# Patient Record
Sex: FEMALE | Race: WHITE | NOT HISPANIC OR LATINO | Employment: OTHER | URBAN - METROPOLITAN AREA
[De-identification: names, ages, dates, MRNs, and addresses within clinical notes are randomized per-mention and may not be internally consistent; named-entity substitution may affect disease eponyms.]

---

## 2018-12-20 ENCOUNTER — OFFICE VISIT (OUTPATIENT)
Dept: MEDICAL GROUP | Age: 83
End: 2018-12-20
Payer: MEDICARE

## 2018-12-20 VITALS
WEIGHT: 132 LBS | DIASTOLIC BLOOD PRESSURE: 60 MMHG | SYSTOLIC BLOOD PRESSURE: 108 MMHG | HEIGHT: 62 IN | OXYGEN SATURATION: 99 % | BODY MASS INDEX: 24.29 KG/M2 | TEMPERATURE: 98.3 F | HEART RATE: 55 BPM

## 2018-12-20 DIAGNOSIS — E78.00 PURE HYPERCHOLESTEROLEMIA: ICD-10-CM

## 2018-12-20 DIAGNOSIS — I69.30 HISTORY OF HEMORRHAGIC CEREBROVASCULAR ACCIDENT (CVA) WITH RESIDUAL DEFICIT: ICD-10-CM

## 2018-12-20 DIAGNOSIS — I10 ESSENTIAL HYPERTENSION: Primary | ICD-10-CM

## 2018-12-20 DIAGNOSIS — Z23 NEED FOR VACCINATION: ICD-10-CM

## 2018-12-20 DIAGNOSIS — R56.9 SEIZURE (HCC): ICD-10-CM

## 2018-12-20 PROCEDURE — G0009 ADMIN PNEUMOCOCCAL VACCINE: HCPCS | Performed by: FAMILY MEDICINE

## 2018-12-20 PROCEDURE — G0008 ADMIN INFLUENZA VIRUS VAC: HCPCS | Performed by: FAMILY MEDICINE

## 2018-12-20 PROCEDURE — 99204 OFFICE O/P NEW MOD 45 MIN: CPT | Mod: 25 | Performed by: FAMILY MEDICINE

## 2018-12-20 PROCEDURE — 90662 IIV NO PRSV INCREASED AG IM: CPT | Performed by: FAMILY MEDICINE

## 2018-12-20 PROCEDURE — 90670 PCV13 VACCINE IM: CPT | Performed by: FAMILY MEDICINE

## 2018-12-20 RX ORDER — LOSARTAN POTASSIUM 100 MG/1
100 TABLET ORAL DAILY
COMMUNITY
End: 2019-06-27 | Stop reason: SDUPTHER

## 2018-12-20 RX ORDER — LACOSAMIDE 50 MG/1
50 TABLET ORAL 2 TIMES DAILY
Qty: 180 TAB | Refills: 1 | Status: SHIPPED
Start: 2018-12-20 | End: 2019-05-15 | Stop reason: SDUPTHER

## 2018-12-20 RX ORDER — ATENOLOL 25 MG/1
12.5 TABLET ORAL DAILY
COMMUNITY
End: 2019-09-19 | Stop reason: SDUPTHER

## 2018-12-20 RX ORDER — ASPIRIN 81 MG/1
81 TABLET, CHEWABLE ORAL DAILY
COMMUNITY
End: 2019-09-17

## 2018-12-20 RX ORDER — LACOSAMIDE 50 MG/1
50 TABLET ORAL 2 TIMES DAILY
COMMUNITY
End: 2018-12-20 | Stop reason: SDUPTHER

## 2018-12-20 RX ORDER — ACETAMINOPHEN 160 MG
3000 TABLET,DISINTEGRATING ORAL
COMMUNITY

## 2018-12-20 RX ORDER — ROSUVASTATIN CALCIUM 20 MG/1
20 TABLET, COATED ORAL EVERY EVENING
COMMUNITY
End: 2019-09-19 | Stop reason: SDUPTHER

## 2018-12-20 ASSESSMENT — PATIENT HEALTH QUESTIONNAIRE - PHQ9: CLINICAL INTERPRETATION OF PHQ2 SCORE: 0

## 2018-12-20 NOTE — PROGRESS NOTES
Subjective:   CC: Establish care    HPI:     Ruchi Camacho is a 84 y.o. female who is here as an established patient.    Patient reports history of hypertension and hypercholesterolemia. She reports surgical history of partial hysterectomy. Patient denies alcohol or drug use. She does not drink alcohol. Patient is not sexually active. Pregnancy history of . Her children are all living.    Patient reports history of seizures. She states she was misdiagnosed with repeated TIAs, but a neurologist finally told them it was seizures. She needs a refill of her medication today.    Patient states her hypercholesterolemia is well controlled with medication.     reports history of left temporal hemorrhagic stroke in . He believes it started with high blood pressure. She had another in  which affected her motor and memory. He states that since then, her motor improved but her short term memory has not.    Patient is requesting influenza and pneumonia vaccine.    Current medicines (including changes today)  Current Outpatient Prescriptions   Medication Sig Dispense Refill   • losartan (COZAAR) 100 MG Tab Take 100 mg by mouth every day.     • aspirin (ASA) 81 MG Chew Tab chewable tablet Take 81 mg by mouth every day.     • Cholecalciferol (VITAMIN D3) 2000 UNIT Cap Take  by mouth.     • rosuvastatin (CRESTOR) 20 MG Tab Take 20 mg by mouth every evening.     • atenolol (TENORMIN) 25 MG Tab Take 12.5 mg by mouth every day.     • lacosamide (VIMPAT) 50 MG Tab tablet Take 1 Tab by mouth 2 Times a Day for 180 days. 180 Tab 1     No current facility-administered medications for this visit.      She  has no past medical history on file.    I personally reviewed patient's problem list, allergies, medications, family hx, social hx with patient and update [facility].     REVIEW OF SYSTEMS:  CONSTITUTIONAL:  Denies night sweats, fatigue, malaise, lethargy, fever or chills.  RESPIRATORY:  Denies cough, wheeze, hemoptysis,  "or shortness of breath.  CARDIOVASCULAR:  Denies chest pains, palpitations, pedal edema     Objective:     Blood pressure 108/60, pulse (!) 55, temperature 36.8 °C (98.3 °F), height 1.562 m (5' 1.5\"), weight 59.9 kg (132 lb), SpO2 99 %. Body mass index is 24.54 kg/m².    Physical Exam:  Constitutional: awake, alert, in no distress.  Skin: Warm, dry, good turgor, no rashes, bruises, ulcers in visible areas.  Neck: Trachea midline, no masses, no thyromegaly. No cervical or supraclavicular lymphadenopathy  Respiratory: Unlabored respiratory effort, lungs clear to auscultation, no wheezes, no rales.  Cardiovascular: Normal S1, S2, no murmur, no pedal edema.  Psych: Oriented x3, affect and mood wnl, intact judgement and insight.       Assessment and Plan:   The following treatment plan was discussed    1. Essential hypertension  Chronic, controlled with atenolol 12.5 mg and losartan 100 mg daily.  Her heart rate is slightly lower than normal.  However, patient's  state that she is feeling fine.  He is not interested in discontinuation of atenolol due to concerns of elevated blood pressure.  Patient is asymptomatic.  Plans:  - CBC WITH DIFFERENTIAL; Future  - COMP METABOLIC PANEL; Future  - MICROALBUMIN CREAT RATIO URINE; Future  -Continue atenolol 12.5 mg and losartan 100 mg daily.  Right leg    2. 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 Seizure (HCC)  Patient has history of hemologic strokes x2 in the past.  She did have some residual motor weakness and memory deficit.  She also developed seizure after.  Patient is taking lacosamide 50 mg twice daily for seizure.  Patient has appointment to establish care with neurology in May 2019.  Patient states that she is not able to get in to see neurology sooner.  However, she will continue to follow-up with her old neurologist in a few months.  Patient is due for refill of lacosamide.  Patient has not had any seizure for quite some " time.  Plans:  - lacosamide (VIMPAT) 50 MG Tab tablet; Take 1 Tab by mouth 2 Times a Day for 180 days.  Dispense: 180 Tab; Refill: 1  -Keep appointment to establish care with neurology  -Continue aspirin and Crestor     4. Pure hypercholesterolemia  Chronic, tolerating Crestor 20 mg daily, denies any side effects.  Will continue.  - Lipid Profile; Future    5. Need for vaccination  - INFLUENZA VACCINE, HIGH DOSE (65+ ONLY)  - Pneumococcal Conjugate Vaccine 13-Valent <4yo IM     Lisa Pratt M.D.    Followup: Return in about 6 months (around 6/20/2019) for Annual wellness visit.    Please note that this dictation was created using voice recognition software and/or scribes. I have made every reasonable attempt to correct obvious errors, but I expect that there are errors of grammar and possibly content that I did not discover before finalizing the note.     Mark SOSA (Jef), am scribing for, and in the presence of, Lisa Pratt M.D.    Electronically signed by: Mark Candelario ([email or web address]), 12/20/2018    ILisa M.D. personally performed the services described in this documentation, as scribed by Mark Candelario in my presence, and it is both accurate and complete.

## 2018-12-20 NOTE — LETTER
[facility]  Lisa Pratt M.D.  [address]   [place] [address]  Fax: [phone]   Authorization for Release/Disclosure of   Protected Health Information   Name: JANELL MARKS : 1934 SSN: [id number]   Address: [address] [place] [address] Phone:    [phone] (home)    I authorize the entity listed below to release/disclose the PHI below to:   [facility]/Lisa Pratt M.D. and Lisa Pratt M.D.   Provider or Entity Name:     Address   City, State, Zip   Phone:      Fax:     Reason for request: continuity of care   Information to be released:    [  ] LAST COLONOSCOPY,  including any PATH REPORT and follow-up  [  ] LAST FIT/COLOGUARD RESULT [  ] LAST DEXA  [  ] LAST MAMMOGRAM  [  ] LAST PAP  [  ] LAST LABS [  ] RETINA EXAM REPORT  [  ] IMMUNIZATION RECORDS  [  ] Release all info      [  ] Check here and initial the line next to each item to release ALL health information INCLUDING  _____ Care and treatment for drug and / or alcohol abuse  _____ HIV testing, infection status, or AIDS  _____ Genetic Testing    DATES OF SERVICE OR TIME PERIOD TO BE DISCLOSED: _____________  I understand and acknowledge that:  * This Authorization may be revoked at any time by you in writing, except if your health information has already been used or disclosed.  * Your health information that will be used or disclosed as a result of you signing this authorization could be re-disclosed by the recipient. If this occurs, your re-disclosed health information may no longer be protected by State or Federal laws.  * You may refuse to sign this Authorization. Your refusal will not affect your ability to obtain treatment.  * This Authorization becomes effective upon signing and will  on (date) __________.      If no date is indicated, this Authorization will  one (1) year from the signature date.    Name: Janell Marks    Signature:   Date:     2018       PLEASE FAX REQUESTED RECORDS BACK TO: [phone]

## 2018-12-20 NOTE — LETTER
[facility]  Lisa Pratt M.D.  [address]   [place] [address]  Fax: [phone]   Authorization for Release/Disclosure of   Protected Health Information   Name: JANELL CAMACHO : 1934 SSN: [id number]   Address: [address] [facility] [place] [address] Phone:    [phone] (home)    I authorize the entity listed below to release/disclose the PHI below to:   [facility]/Lisa Pratt M.D. and Lisa Pratt M.D.   Provider or Entity Name:   Trixie Grant MD     Address   City, State, Zip   Phone:      Fax:     Reason for request: continuity of care   Information to be released:    [  ] LAST COLONOSCOPY,  including any PATH REPORT and follow-up  [  ] LAST FIT/COLOGUARD RESULT [  ] LAST DEXA  [  ] LAST MAMMOGRAM  [  ] LAST PAP  [  ] LAST LABS [  ] RETINA EXAM REPORT  [  ] IMMUNIZATION RECORDS  [ X ] Release all info      [  ] Check here and initial the line next to each item to release ALL health information INCLUDING  _____ Care and treatment for drug and / or alcohol abuse  _____ HIV testing, infection status, or AIDS  _____ Genetic Testing    DATES OF SERVICE OR TIME PERIOD TO BE DISCLOSED: _____________  I understand and acknowledge that:  * This Authorization may be revoked at any time by you in writing, except if your health information has already been used or disclosed.  * Your health information that will be used or disclosed as a result of you signing this authorization could be re-disclosed by the recipient. If this occurs, your re-disclosed health information may no longer be protected by State or Federal laws.  * You may refuse to sign this Authorization. Your refusal will not affect your ability to obtain treatment.  * This Authorization becomes effective upon signing and will  on (date) __________.      If no date is indicated, this Authorization will  one (1) year from the signature date.    Name: Janell Camacho    Signature:   Date:     2018       PLEASE FAX REQUESTED  RECORDS BACK TO: [phone]

## 2019-03-14 ENCOUNTER — HOSPITAL ENCOUNTER (OUTPATIENT)
Dept: LAB | Facility: MEDICAL CENTER | Age: 84
End: 2019-03-14
Attending: FAMILY MEDICINE
Payer: MEDICARE

## 2019-03-14 ENCOUNTER — TELEPHONE (OUTPATIENT)
Dept: MEDICAL GROUP | Age: 84
End: 2019-03-14

## 2019-03-14 DIAGNOSIS — E78.00 PURE HYPERCHOLESTEROLEMIA: ICD-10-CM

## 2019-03-14 DIAGNOSIS — I10 ESSENTIAL HYPERTENSION: ICD-10-CM

## 2019-03-14 LAB
ALBUMIN SERPL BCP-MCNC: 4.5 G/DL (ref 3.2–4.9)
ALBUMIN/GLOB SERPL: 1.4 G/DL
ALP SERPL-CCNC: 50 U/L (ref 30–99)
ALT SERPL-CCNC: 10 U/L (ref 2–50)
ANION GAP SERPL CALC-SCNC: 9 MMOL/L (ref 0–11.9)
AST SERPL-CCNC: 16 U/L (ref 12–45)
BASOPHILS # BLD AUTO: 0.9 % (ref 0–1.8)
BASOPHILS # BLD: 0.06 K/UL (ref 0–0.12)
BILIRUB SERPL-MCNC: 0.8 MG/DL (ref 0.1–1.5)
BUN SERPL-MCNC: 26 MG/DL (ref 8–22)
CALCIUM SERPL-MCNC: 10 MG/DL (ref 8.5–10.5)
CHLORIDE SERPL-SCNC: 106 MMOL/L (ref 96–112)
CHOLEST SERPL-MCNC: 131 MG/DL (ref 100–199)
CO2 SERPL-SCNC: 26 MMOL/L (ref 20–33)
CREAT SERPL-MCNC: 0.82 MG/DL (ref 0.5–1.4)
CREAT UR-MCNC: 60 MG/DL
EOSINOPHIL # BLD AUTO: 0.12 K/UL (ref 0–0.51)
EOSINOPHIL NFR BLD: 1.9 % (ref 0–6.9)
ERYTHROCYTE [DISTWIDTH] IN BLOOD BY AUTOMATED COUNT: 45.7 FL (ref 35.9–50)
FASTING STATUS PATIENT QL REPORTED: NORMAL
GLOBULIN SER CALC-MCNC: 3.2 G/DL (ref 1.9–3.5)
GLUCOSE SERPL-MCNC: 101 MG/DL (ref 65–99)
HCT VFR BLD AUTO: 43.8 % (ref 37–47)
HDLC SERPL-MCNC: 42 MG/DL
HGB BLD-MCNC: 14 G/DL (ref 12–16)
IMM GRANULOCYTES # BLD AUTO: 0.01 K/UL (ref 0–0.11)
IMM GRANULOCYTES NFR BLD AUTO: 0.2 % (ref 0–0.9)
LDLC SERPL CALC-MCNC: 74 MG/DL
LYMPHOCYTES # BLD AUTO: 2.08 K/UL (ref 1–4.8)
LYMPHOCYTES NFR BLD: 32.2 % (ref 22–41)
MCH RBC QN AUTO: 30.2 PG (ref 27–33)
MCHC RBC AUTO-ENTMCNC: 32 G/DL (ref 33.6–35)
MCV RBC AUTO: 94.4 FL (ref 81.4–97.8)
MICROALBUMIN UR-MCNC: <0.7 MG/DL
MICROALBUMIN/CREAT UR: NORMAL MG/G (ref 0–30)
MONOCYTES # BLD AUTO: 0.54 K/UL (ref 0–0.85)
MONOCYTES NFR BLD AUTO: 8.4 % (ref 0–13.4)
NEUTROPHILS # BLD AUTO: 3.65 K/UL (ref 2–7.15)
NEUTROPHILS NFR BLD: 56.4 % (ref 44–72)
NRBC # BLD AUTO: 0 K/UL
NRBC BLD-RTO: 0 /100 WBC
PLATELET # BLD AUTO: 203 K/UL (ref 164–446)
PMV BLD AUTO: 10.4 FL (ref 9–12.9)
POTASSIUM SERPL-SCNC: 4 MMOL/L (ref 3.6–5.5)
PROT SERPL-MCNC: 7.7 G/DL (ref 6–8.2)
RBC # BLD AUTO: 4.64 M/UL (ref 4.2–5.4)
SODIUM SERPL-SCNC: 141 MMOL/L (ref 135–145)
TRIGL SERPL-MCNC: 76 MG/DL (ref 0–149)
WBC # BLD AUTO: 6.5 K/UL (ref 4.8–10.8)

## 2019-03-14 PROCEDURE — 85025 COMPLETE CBC W/AUTO DIFF WBC: CPT

## 2019-03-14 PROCEDURE — 82570 ASSAY OF URINE CREATININE: CPT

## 2019-03-14 PROCEDURE — 36415 COLL VENOUS BLD VENIPUNCTURE: CPT

## 2019-03-14 PROCEDURE — 80053 COMPREHEN METABOLIC PANEL: CPT

## 2019-03-14 PROCEDURE — 80061 LIPID PANEL: CPT

## 2019-03-14 PROCEDURE — 82043 UR ALBUMIN QUANTITATIVE: CPT

## 2019-03-14 NOTE — TELEPHONE ENCOUNTER
ANNUAL WELLNESS VISIT PRE-VISIT PLANNING WITHOUT OUTREACH    1.  Reviewed note from last office visit with PCP: YES    2.  If any orders were placed at last visit, do we have Results/Consult Notes?        •  Labs - Labs ordered, completed on 3/14/19 and results are in chart.  Note: If patient appointment is for lab review and patient did not complete labs, check with provider if OK to reschedule patient until labs completed.       •  Imaging - Imaging was not ordered at last office visit.       •  Referrals - No referrals were ordered at last office visit.    3.  Immunizations were updated in Epic using WebIZ?: Epic matches WebIZ       •  WebIZ Recommendations: SHINGRIX (Shingles)        •  Is patient due for Tdap? NO       •  Is patient due for Shingrix? YES. Patient was not notified of copay/out of pocket cost.     4.  Patient is due for the following Health Maintenance Topics:   Health Maintenance Due   Topic Date Due   • Annual Wellness Visit  08/01/1934   • IMM DTaP/Tdap/Td Vaccine (1 - Tdap) 08/01/1953   • BONE DENSITY  08/01/1999   • IMM ZOSTER VACCINES (2 of 3) 05/14/2014           5.  Reviewed/Updated the following with patient:       •   Preferred Pharmacy? YES       •   Preferred Lab? YES       •   Preferred Communication? YES       •   Allergies? YES       •   Medications? YES. Was Abstract Encounter opened and chart updated? YES       •   Social History? YES. Was Abstract Encounter opened and chart updated? YES       •   Family History (document living status of immediate family members and if + hx of  cancer, diabetes, hypertension, hyperlipidemia, heart attack, stroke) YES. Was Abstract Encounter opened and chart updated? YES    6.  Care Team Updated:       •   DME Company (gait device, O2, CPAP, etc.): YES       •   Other Specialists (eye doctor, derm, GYN, cardiology, endo, etc): YES    7. Orders for overdue Health Maintenance topics pended in Pre-Charting?     8.  Patient has the following Care  Path diagnoses on Problem List:  NONE    9.  Patient was advised: “This is a free wellness visit. The provider will screen for medical conditions to help you stay healthy. If you have other concerns to address you may be asked to discuss these at a separate visit or there may be an additional fee.”     10.  Patient was informed to arrive 15 min prior to their scheduled appointment and bring in their medication bottles.

## 2019-03-18 ENCOUNTER — OFFICE VISIT (OUTPATIENT)
Dept: MEDICAL GROUP | Age: 84
End: 2019-03-18
Payer: MEDICARE

## 2019-03-18 VITALS
HEIGHT: 62 IN | HEART RATE: 62 BPM | SYSTOLIC BLOOD PRESSURE: 136 MMHG | BODY MASS INDEX: 24.01 KG/M2 | TEMPERATURE: 97.3 F | OXYGEN SATURATION: 99 % | WEIGHT: 130.5 LBS | DIASTOLIC BLOOD PRESSURE: 74 MMHG

## 2019-03-18 DIAGNOSIS — R56.9 SEIZURE (HCC): ICD-10-CM

## 2019-03-18 DIAGNOSIS — H61.23 BILATERAL IMPACTED CERUMEN: ICD-10-CM

## 2019-03-18 DIAGNOSIS — F01.50 VASCULAR DEMENTIA WITHOUT BEHAVIORAL DISTURBANCE (HCC): ICD-10-CM

## 2019-03-18 DIAGNOSIS — I10 ESSENTIAL HYPERTENSION: ICD-10-CM

## 2019-03-18 DIAGNOSIS — M81.0 AGE-RELATED OSTEOPOROSIS WITHOUT CURRENT PATHOLOGICAL FRACTURE: ICD-10-CM

## 2019-03-18 DIAGNOSIS — Z00.00 MEDICARE ANNUAL WELLNESS VISIT, SUBSEQUENT: ICD-10-CM

## 2019-03-18 DIAGNOSIS — E78.00 PURE HYPERCHOLESTEROLEMIA: ICD-10-CM

## 2019-03-18 DIAGNOSIS — Z86.79 HISTORY OF SUBARACHNOID HEMORRHAGE: ICD-10-CM

## 2019-03-18 DIAGNOSIS — I69.30 HISTORY OF HEMORRHAGIC CEREBROVASCULAR ACCIDENT (CVA) WITH RESIDUAL DEFICIT: ICD-10-CM

## 2019-03-18 PROCEDURE — G0439 PPPS, SUBSEQ VISIT: HCPCS | Mod: 25 | Performed by: FAMILY MEDICINE

## 2019-03-18 PROCEDURE — 69210 REMOVE IMPACTED EAR WAX UNI: CPT | Performed by: FAMILY MEDICINE

## 2019-03-18 RX ORDER — ALBUTEROL SULFATE 90 UG/1
2 AEROSOL, METERED RESPIRATORY (INHALATION)
COMMUNITY
Start: 2017-02-10 | End: 2019-03-18

## 2019-03-18 RX ORDER — CALCIUM CARBONATE 500 MG/1
500 TABLET, CHEWABLE ORAL DAILY
COMMUNITY
End: 2021-01-01

## 2019-03-18 ASSESSMENT — PATIENT HEALTH QUESTIONNAIRE - PHQ9
SUM OF ALL RESPONSES TO PHQ QUESTIONS 1-9: 5
CLINICAL INTERPRETATION OF PHQ2 SCORE: 2
5. POOR APPETITE OR OVEREATING: 0 - NOT AT ALL

## 2019-03-18 ASSESSMENT — ACTIVITIES OF DAILY LIVING (ADL): BATHING_REQUIRES_ASSISTANCE: 0

## 2019-03-18 ASSESSMENT — ENCOUNTER SYMPTOMS: GENERAL WELL-BEING: FAIR

## 2019-03-18 NOTE — PROGRESS NOTES
Chief Complaint   Patient presents with   • Annual Wellness Visit     labs review         HPI:  Ruchi Camacho is a 84 y.o. here for Medicare Annual Wellness Visit.    She complains of left ear pain. The ear is only painful if she uses a Q-tip to remove ear wax. Her  has noticed a decrease in her hearing recently.    The patient has a history of hypertension, seizure, hypercholesterolemia, osteoporosis, and CVA. She is complaint with her medications and denies any side effects from them. Her most recent home blood pressure was 108/60 and her blood pressure is 136/74 in the office today. Her most recent lipid panel was normal. She has had no seizures since she started Vimpat approximately 1 year ago. She was last seen by Dr. Pringle, neurologist in [place], in January of this year.  She is scheduled to see Dr. Roper in May of this year. The patient is unsure of a history of alzheimer's disease, although, her  is aware of her residual dementia following her stroke. Dr. Pringle has never brought up alzheimer's disease. She was taking Fossamax for her osteoporosis, but discontinued this medication out of concern for stroke. Patient has had no recent falls or bone fractures.     Patient Active Problem List    Diagnosis Date Noted   • History of subarachnoid hemorrhage 03/18/2019   • Essential hypertension 12/20/2018   • Seizure (HCC) 12/20/2018   • Pure hypercholesterolemia 12/20/2018   • History of hemorrhagic cerebrovascular accident (CVA) with residual deficit x2 12/20/2018   • Osteoporosis 09/26/2017   • Mixed hyperlipidemia 03/27/2013       Current Outpatient Prescriptions   Medication Sig Dispense Refill   • calcium carbonate (TUMS) 500 MG Chew Tab Take 500 mg by mouth every day. BID     • losartan (COZAAR) 100 MG Tab Take 100 mg by mouth every day.     • aspirin (ASA) 81 MG Chew Tab chewable tablet Take 81 mg by mouth every day. 1/2 tablet once a day     • Cholecalciferol (VITAMIN D3) 2000 UNIT Cap  Take  by mouth.     • rosuvastatin (CRESTOR) 20 MG Tab Take 20 mg by mouth every evening.     • atenolol (TENORMIN) 25 MG Tab Take 12.5 mg by mouth every day. 1/2 a tablet     • lacosamide (VIMPAT) 50 MG Tab tablet Take 1 Tab by mouth 2 Times a Day for 180 days. 180 Tab 1     No current facility-administered medications for this visit.             Current supplements as per medication list.       Allergies: Patient has no allergy information on record.    Current social contact/activities: spend time with family     She  reports that she has never smoked. She has never used smokeless tobacco. She reports that she does not drink alcohol or use drugs.  Counseling given: Not Answered      DPA/Advanced Directive:  Patient has Advanced Directive on file.     ROS:    Gait: Uses no assistive device  Ostomy: No  Other tubes: No  Amputations: No  Chronic oxygen use: No  Last eye exam: 2016  Wears hearing aids: No   : Denies any urinary leakage during the last 6 months    Screening:    Depression Screening    Little interest or pleasure in doing things?  1 - several days  Feeling down, depressed , or hopeless? 1 - several days  Trouble falling or staying asleep, or sleeping too much?  0 - not at all  Feeling tired or having little energy?  1 - several days  Poor appetite or overeating?  0 - not at all  Feeling bad about yourself - or that you are a failure or have let yourself or your family down? 1 - several days  Trouble concentrating on things, such as reading the newspaper or watching television? 1 - several days  Moving or speaking so slowly that other people could have noticed.  Or the opposite - being so fidgety or restless that you have been moving around a lot more than usual?  0 - not at all  Thoughts that you would be better off dead, or of hurting yourself?  0 - not at all  Patient Health Questionnaire Score: 5      Screening for Cognitive Impairment    Three Minute Recall (leader, season, table) 0/3 Short term  memory issues  Neal clock face with all 12 numbers and set the hands to show 10 past 11.  No Short term memory issues    Fall Risk Assessment    Has the patient had two or more falls in the last year or any fall with injury in the last year?  No    Safety Assessment    Throw rugs on floor.  Yes  Handrails on all stairs.  No  Good lighting in all hallways.  Yes  Difficulty hearing.  No  Patient counseled about all safety risks that were identified.    Functional Assessment ADLs    Are there any barriers preventing you from cooking for yourself or meeting nutritional needs?  Yes. With help from family  Are there any barriers preventing you from driving safely or obtaining transportation?  Yes. With help from family  Are there any barriers preventing you from using a telephone or calling for help?  No.    Are there any barriers preventing you from shopping?  No.    Are there any barriers preventing you from taking care of your own finances?  Yes. With help from family  Are there any barriers preventing you from managing your medications?  Yes. With help from family  Are there any barriers preventing you from showering, bathing or dressing yourself? No.    Are you currently engaging in any exercise or physical activity?  Yes.  walking  What is your perception of your health?  Fair.      Health Maintenance Summary                Annual Wellness Visit Overdue 8/1/1934     BONE DENSITY Overdue 8/1/1999     IMM ZOSTER VACCINES Overdue 5/14/2014      Done 3/19/2014 Imm Admin: Zoster Vaccine Live (ZVL) (Zostavax)    IMM DTaP/Tdap/Td Vaccine Overdue 1/1/2018      Done 1/1/2008 Imm Admin: Tdap Vaccine          Patient Care Team:  Lisa Pratt M.D. as PCP - General (Family Medicine)  Elias Roper M.D. (Neurology)      Social History   Substance Use Topics   • Smoking status: Never Smoker   • Smokeless tobacco: Never Used   • Alcohol use No     Family History   Problem Relation Age of Onset   • Heart Failure Mother    •  "Stroke Father    • Heart Failure Father    • Cancer Sister      She has no past surgical history on file.    Exam:   Blood pressure 136/74, pulse 62, temperature 36.3 °C (97.3 °F), height 1.562 m (5' 1.5\"), weight 59.2 kg (130 lb 8 oz), SpO2 99 %. Body mass index is 24.26 kg/m².    Hearing excellent.    Dentition good  Alert, oriented in no acute distress.  Eye contact is good, speech goal directed, affect calm    Constitutional: awake, alert, in no distress.  Skin: Warm, dry, good turgor, no rashes, bruises, ulcers in visible areas.  Eye: conjunctiva clear, lids neg for edema or lesions.  ENMT: Moderate cerumen impaction on the right and mild cerumen on the left. TMs wnl. Oral and nasal mucosa wnl. Lips without lesions, good dentition, oropharynx clear.  Neck: Trachea midline, no masses, no thyromegaly. No cervical or supraclavicular lymphadenopathy  Respiratory: Unlabored respiratory effort, lungs clear to auscultation, no wheezes, no rales.  Cardiovascular: Normal S1, S2, no murmur, no pedal edema.  Neuro: CN2-12 grossly intact. Strength 5/5, reflexes 2/4 in all extremities, no sensory deficits.   Psych: Oriented x3, affect and mood wnl, intact judgement and insight.     Assessment and Plan. The following treatment and monitoring plan is recommended:      1. Seizure, complex partial   Diagnosed by neurology in [place], on Vimpat and tolerates it well, denies any side effects. Last episode of seizure was approx 1 year ago. Pt will establish care with local neurologist, Dr. Roper. Plans:   - continue Vimpat and f/u with neurology as directed.     2. Pure hypercholesterolemia  Chronic, on Crestor 20 mg daily, will continue Crestor at current dose.     3. 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 Vascular dementia without behavioral disturbance  5. History of subarachnoid hemorrhage  Hx of hemorrhagic strokes x2 with residue cognitive deficits mainly memory. Pt was working with " neurology in [place] but will establish care with Dr. Roper soon. She is on Aspirin and Crestor. She tolerates both medications well, denies any side effect.   - cont aspirin and Lipitor  - f/u with neurology as directed.     6. Essential hypertension  Chronic, controlled with Atenolol and Losartan, /74 today.   Will cont both medication at current dosage  Lifestyle modification     7 Age-related osteoporosis without current pathological fracture  Chronic, diagnosed in 2017, started on Fosamax but quickly discontinued due to side effects. Pt is taking Calcium and vitamin D daily. Declined further treatment or evaluation at this time. Denies hx of fall or bone fracture. Plans:   - Calcium and Vitamin D  - Weight-bearing aerobic and strengthening exercises can decrease risk of falls and fractures by improving muscle strength, coordination, balance, and mobility  - Limit caffeine intake to 2 or fewer servings per day  - Limit alcohol consumption to one drink per day  - pt was counseled on fall risk prevention and annual eye exam     8. Bilateral impacted cerumen  Exam was notable for bilateral cerumen impaction.   - ear wax removal.     Procedure note: ear wax removal.   I personally removed the rest of ear wax using a lighted curette pt tolerated the procedure well, no immediate complication noted.      9. Medicare annual wellness visit, subsequent  - Initial Annual Wellness Visit - Includes PPPS ()       Services suggested: No services needed at this time  Health Care Screening: Age-appropriate preventive services recommended by USPTF and ACIP covered by Medicare were discussed today. Services ordered if indicated and agreed upon by the patient.  Referrals offered: Community-based lifestyle interventions to reduce health risks and promote self-management and wellness, fall prevention, nutrition, physical activity, tobacco-use cessation, weight loss, and mental health services as per orders if  indicated.    Discussion today about general wellness and lifestyle habits:    · Prevent falls and reduce trip hazards; Cautioned about securing or removing rugs.  · Have a working fire alarm and carbon monoxide detector;   · Engage in regular physical activity and social activities     Follow-up: Return in about 6 months (around 9/18/2019).

## 2019-03-18 NOTE — PATIENT INSTRUCTIONS
OVERALL FINDINGS    OVERALL INTERPRETATION:     Osteoporosis   ..........................................................................  ..........................................................................       HIP    ACCEPTABLE FOR INTERPRETATION:   Yes.   Z SCORE:   Expected.   T SCORE:   Osteoporosis.   COMPARED TO PREVIOUS STUDY:   Significant decreased density.     SPINE    ACCEPTABLE FOR INTERPRETATION:   Yes.   Z SCORE:   Expected.   T SCORE:   Osteoporosis.   COMPARED TO PREVIOUS STUDY:   Significant decreased density.

## 2019-05-15 ENCOUNTER — OFFICE VISIT (OUTPATIENT)
Dept: NEUROLOGY | Facility: MEDICAL CENTER | Age: 84
End: 2019-05-15
Payer: MEDICARE

## 2019-05-15 VITALS
TEMPERATURE: 97.2 F | WEIGHT: 131.6 LBS | OXYGEN SATURATION: 97 % | HEART RATE: 60 BPM | SYSTOLIC BLOOD PRESSURE: 140 MMHG | HEIGHT: 62 IN | DIASTOLIC BLOOD PRESSURE: 70 MMHG | BODY MASS INDEX: 24.22 KG/M2 | RESPIRATION RATE: 16 BRPM

## 2019-05-15 DIAGNOSIS — R56.9 SEIZURE (HCC): ICD-10-CM

## 2019-05-15 DIAGNOSIS — I61.1 NONTRAUMATIC CORTICAL HEMORRHAGE OF LEFT CEREBRAL HEMISPHERE (HCC): ICD-10-CM

## 2019-05-15 DIAGNOSIS — I69.30 HISTORY OF HEMORRHAGIC CEREBROVASCULAR ACCIDENT (CVA) WITH RESIDUAL DEFICIT: ICD-10-CM

## 2019-05-15 DIAGNOSIS — F01.518 MIXED CORTICAL AND SUBCORTICAL VASCULAR DEMENTIA WITH BEHAVIORAL DISTURBANCE (HCC): ICD-10-CM

## 2019-05-15 PROBLEM — F01.50 MIXED CORTICAL AND SUBCORTICAL VASCULAR DEMENTIA WITHOUT BEHAVIORAL DISTURBANCE (HCC): Status: ACTIVE | Noted: 2019-05-15

## 2019-05-15 PROCEDURE — 99205 OFFICE O/P NEW HI 60 MIN: CPT | Performed by: PSYCHIATRY & NEUROLOGY

## 2019-05-15 RX ORDER — LACOSAMIDE 50 MG/1
50 TABLET ORAL 2 TIMES DAILY
Qty: 180 TAB | Refills: 3 | Status: SHIPPED | OUTPATIENT
Start: 2019-05-15 | End: 2020-01-02 | Stop reason: SDUPTHER

## 2019-05-15 RX ORDER — ALPRAZOLAM 0.25 MG/1
TABLET ORAL
Qty: 5 TAB | Refills: 0 | Status: SHIPPED | OUTPATIENT
Start: 2019-05-15 | End: 2019-06-15

## 2019-05-16 NOTE — PROGRESS NOTES
Subjective:      Ruchi Camacho is a 84 y.o. female who presents with her  Raul and her daughter Loren, from the office of Dr. Pratt, for consultation with a history of dementia, symptomatic seizures and ICH/CVA.  The history is for the most part gotten from review of records as well as discussions with the patient's family, she is incapable of giving anything cogent.    HPI    Patient is a pleasant 84-year-old [place] American woman who has been having issues developing many years ago following her first left temporal intracranial hemorrhage in 2008.  The patient herself is limited in her ability to respond verbally, she states that she has no problems, she is on no medications, etc.    In 2008, she had suffered from a left temporal intracranial hemorrhage when she developed a sudden aphasia, impaired concentration, loss of comprehension and mild motor deficits.  The work-up evidently was unremarkable for cause of the hemorrhage, but she was not discharged on any stroke prophylaxis except for a statin agent.  About 2 years later she developed an episode of transient right upper extremity weakness and right facial droop, the symptoms resolving very quickly.  Evidently the work-up did not show any evidence of ischemic insult.  During this time she also had developed a nontraumatic subdural hematoma, evidently an incidental finding, obviously asymptomatic.    The last event was about 2 years ago, occurring at nighttime when she had become suddenly unresponsive and her  had found her incontinent.  She began to awaken prior to ambulance services arriving, and by the time Raul had gotten to her in the emergency room, she was almost back at baseline.  The work-up was complete, including an EEG study, imaging revealed no evidence of new ischemia or hemorrhage, they evidently felt she had return to her baseline.  They do not know of the results of the EEG.  She was placed on Keppra, the events presumed to  "be ictal in nature, but she developed significant side effects.  On Vimpat 50 mg, twice daily, she has had good control, these events have stopped.    I reviewed copies of the office notes from her treating physician in [place].  Because of these hemorrhagic events, she was felt to be at risk for further hemorrhage, balancing the risk for ischemia, she has been on a half baby aspirin daily, Crestor has been continued \"for a while\".  She has never been on fish oil.    Since 2008, there has been a slow and steady deterioration in cognitive function even between the events described above.  She would also have brief events where she would suddenly space out, there could be some involuntary movements of the right lower extremity, the right upper extremity becoming transiently weakened.  The recovery was rather quick, attention was never sought.  The last of these was before the event 2 years ago when she was placed on AED treatment.    Cognitive function has continued to worsen.  The language deficits both production as well as comprehension continue.  She will spontaneously relapse to using her native [place] language, in which case fluency is more normal.  Speech in general is more empty, a paucity of content the norm.  She will repeat herself, there have also been significant personality changes, she becomes much more angry easily, she is perseverative.  They are having difficulties with severe nightmares and vivid dreams, she often lives the dream as if it is real.  Though she is eating, her weight has slowly and steadily come down.  She does require cueing with some of her daily routines though she does do them on her own such as her morning evolutions, showering and dressing.  Bowel and bladder function have been stable.  Though she is more unsteady to observation, she does not fall with regularity.  The EPS systems review is negative.    In addition to the above, she has a history of hypertension, osteoporosis " "and dyslipidemia.  There is no history of malignancy, thyroid disease, autoimmune disease, psychiatric disease, REGINALDO, migraine with aura, arrhythmia, CAD or PVD, liver or kidney disease or blood dyscrasia.  There is no surgical history of note from my standpoint.    Her mother suffered from stroke and dementia, her father from heart disease, PVD, 1 of 2 sisters does have dementia, none of the children suffer from any type of neurologic disorder.  She does not smoke or drink.    She is on one half baby aspirin daily, Tenormin 12.5 mg daily, Cozaar 100 mg daily, Crestor 20 mg daily and vitamin D with calcium supplement daily.    Review of Systems   Unable to perform ROS: Dementia        Objective:     /70 (BP Location: Right arm, Patient Position: Sitting)   Pulse 60   Temp 36.2 °C (97.2 °F) (Temporal)   Resp 16   Ht 1.562 m (5' 1.5\")   Wt 59.7 kg (131 lb 9.6 oz)   SpO2 97%   BMI 24.46 kg/m²      Physical Exam    She appears in no acute distress.  Clean and appropriately dressed, she is quite cooperative.  Still, the ability to communicate is severely limited.  Her vital signs are stable.  There is no malar rash, temporal or jaw tenderness, or jaw claudication.  Her neck is supple, range of motion is full, carotid pulses are present bilaterally without asymmetry.  There are no bruits.  Cardiac evaluation reveals a regular rhythm.    Cognition is severely curtailed.  There is evidence of any global aphasia, word production and fluency more severely curtailed then comprehension, visual comprehension more problematic than auditory.  She is quite perseverative.  She requires multiple requests to perform single command, multistep commands are an impossibility.  Frontal release signs are absent.  She can repeat.  When given a pen, she seems to know what it is but has difficulty demonstrating how to use it.  When asked to comb her hair, she does do so spontaneously though there is hand-object " "substitution.    PERRLA/EOMI, visual fields are grossly full to movement detection and to threat, facial movements are symmetric, there is no sensory loss of temperature.  The tongue and uvula are midline, jaw jerk is present, shoulder shrug is intact.    Musculoskeletal exam reveals no obvious drift, tone is slightly increased on the right.  Strength is grossly intact throughout.  Reflexes clearly show a right-sided predominance, but it is the left toe that is upgoing, the right downgoing.    She is slightly unsteady when she walks, there is some circumduction with the left leg, the left arm swing diminished.  There is no appendicular dystaxia with the upper extremities, I cannot get her to cooperate using the lower extremity's.  Repetitive movements are clumsy, but seem to be symmetric.    Sensory exam is limited but she seems to feel temperature and vibration equally.     Assessment/Plan:     1. Seizure (HCC)  I suspect she has had focal seizures emanating from the left temporal lobe, some of the \"stroke\" admissions from the past were likely post ictal in nature.  On Vimpat, it is not surprising that she has had no further admissions in the last 2 years.  A baseline EEG should be done for thoroughness simply to better identify where the focus is so that the family can understand what might be postictal and what might in fact be ischemic in nature.  Vimpat will be continued unchanged.  MRI of the brain should be done as part of any seizure work-up.    - REFERRAL TO NEURODIAGNOSTICS (EEG,EP,EMG/NCS/DBS) Modality Requested: EEG-Video  - MR-BRAIN-W/O; Future  - lacosamide (VIMPAT) 50 MG Tab tablet; Take 1 Tab by mouth 2 Times a Day for 180 days.  Dispense: 180 Tab; Refill: 3  - ALPRAZolam (XANAX) 0.25 MG Tab; 1 tab 1 hour prior to MRI, repeat every hour as needed  Dispense: 5 Tab; Refill: 0    2. 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  MRI of the brain and MRA of the brain are critical in this regard.  For now a half baby aspirin will be continued, but if my suspicions are supported, even this will need to be stopped.  We talked about this briefly.    Always a balancing act in patients with cerebrovascular disease risk, as to what can be done safely to minimize this risk.  Thus, continuing Crestor and possibly adding fish oil might prove beneficial.    - MR-BRAIN-W/O; Future  - MR-MRA HEAD-W/O; Future    3. 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  The dementia is too severe to warrant using cholinesterase inhibitors, even if this were purely cortical in nature.  I talked about this briefly.  Family understands that the patient is not competent, they do have enriquez of  for healthcare and finance appointed to Raul as well as an advanced directive, I asked that these be brought in to be put in the medical record.    The prognosis for this is quite grim, though I still suspect she has a few good years left.  Family understands what all of this entails.  Symptomatically, we can add some melatonin in the evening if the nightmares become problematic.  Fortunately, Ruchi seems quite happy in her present circumstance.  I will follow-up with him in the next for 5 months, we will call them with test results as they come in if it warrants a change in my treatment recommendations, otherwise we talk specifics when I see them.    Time: 60 minutes spent face-to-face for exam, review, discussion, and education, of this over 50% of the time spent counseling and coordinating care.

## 2019-05-28 ENCOUNTER — HOSPITAL ENCOUNTER (OUTPATIENT)
Dept: RADIOLOGY | Facility: MEDICAL CENTER | Age: 84
End: 2019-05-28
Attending: PSYCHIATRY & NEUROLOGY
Payer: MEDICARE

## 2019-05-28 DIAGNOSIS — I61.1 NONTRAUMATIC CORTICAL HEMORRHAGE OF LEFT CEREBRAL HEMISPHERE (HCC): ICD-10-CM

## 2019-05-28 DIAGNOSIS — R56.9 SEIZURE (HCC): ICD-10-CM

## 2019-05-28 DIAGNOSIS — I69.30 HISTORY OF HEMORRHAGIC CEREBROVASCULAR ACCIDENT (CVA) WITH RESIDUAL DEFICIT: ICD-10-CM

## 2019-05-28 PROCEDURE — 70544 MR ANGIOGRAPHY HEAD W/O DYE: CPT

## 2019-05-28 PROCEDURE — 70551 MRI BRAIN STEM W/O DYE: CPT

## 2019-06-28 RX ORDER — LOSARTAN POTASSIUM 100 MG/1
100 TABLET ORAL DAILY
Qty: 90 TAB | Refills: 1 | Status: SHIPPED | OUTPATIENT
Start: 2019-06-28 | End: 2019-09-19 | Stop reason: SDUPTHER

## 2019-09-17 ENCOUNTER — OFFICE VISIT (OUTPATIENT)
Dept: NEUROLOGY | Facility: MEDICAL CENTER | Age: 84
End: 2019-09-17
Payer: MEDICARE

## 2019-09-17 VITALS
HEART RATE: 68 BPM | BODY MASS INDEX: 23.92 KG/M2 | HEIGHT: 62 IN | TEMPERATURE: 98.9 F | SYSTOLIC BLOOD PRESSURE: 124 MMHG | OXYGEN SATURATION: 95 % | WEIGHT: 130 LBS | DIASTOLIC BLOOD PRESSURE: 80 MMHG

## 2019-09-17 DIAGNOSIS — R56.9 SEIZURE (HCC): Primary | ICD-10-CM

## 2019-09-17 DIAGNOSIS — F01.518 MIXED CORTICAL AND SUBCORTICAL VASCULAR DEMENTIA WITH BEHAVIORAL DISTURBANCE (HCC): ICD-10-CM

## 2019-09-17 DIAGNOSIS — I61.1 NONTRAUMATIC CORTICAL HEMORRHAGE OF LEFT CEREBRAL HEMISPHERE (HCC): ICD-10-CM

## 2019-09-17 PROCEDURE — 99214 OFFICE O/P EST MOD 30 MIN: CPT | Performed by: PSYCHIATRY & NEUROLOGY

## 2019-09-17 NOTE — PROGRESS NOTES
Subjective:      Ruchi Camacho is a 85 y.o. female who presents her  Raul and daughter Loren, for follow-up, with a history of a mixed cortical and subcortical dementia, spontaneous intracranial hemorrhage and symptomatic focal seizures with altered sensorium.  The dementia prevents her from providing any consistent and cogent history.    HPI    Seizures: Stable on Vimpat 50 mg, twice daily, she is tolerated the drug well.  On this dose, she has had no event recurrences, events characterized by altered sensorium, right upper extremity twitching and right lower extremity weakness.  The last occurred over 2 years ago before she was placed on antiepileptic therapy.  She has never had problems with side effects of the Vimpat.  Her video EEG is pending in the next week.    Cerebral hemorrhage: MRI of the brain did not reveal evidence of amyloid angiopathy, just the chronic sequelae of the left temporal intracranial hemorrhage and subdural hemorrhage, no evidence of acute ischemia or recurrent hemorrhage.  MRA of the brain was normal.    Dementia: Still problematic to say the least, there has been some minimal change only, she still remains a bit impulsive, becomes angry easily, behaviorally there have been the potential problems for wandering, etc.  Raul puts her medicines out, she does take them, she does most of her ADLs without issue or assistance, she does require cueing.  She still repeats herself frequently, memories from moment to moment are severely curtailed.  Language deficits remain unchanged, always problematic.    Medical, surgical and family histories are reviewed in the electronic health record, there are no new drug allergies.  She is still on half a baby aspirin daily along with Crestor 20 mg every evening.  Blood pressure control has been stabilized, she is now on Cozaar 100 mg in the morning but Tenormin 12.5 mg was added in the evening which has helped control the a.m. elevations  "prior to Cozaar being taken.  Dr. Pratt is aware of this.    Review of Systems   Unable to perform ROS: Dementia        Objective:     /80 (BP Location: Left arm, Patient Position: Sitting, BP Cuff Size: Adult)   Pulse 68   Temp 37.2 °C (98.9 °F) (Temporal)   Ht 1.562 m (5' 1.5\")   Wt 59 kg (130 lb)   SpO2 95%   Breastfeeding? No   BMI 24.17 kg/m²      Physical Exam    She appears in no acute distress.  Clean and appropriately dressed, she is cooperative.  There is no malar rash.  The neck is supple.  Cardiac evaluation is unremarkable.    Her aphasia is unchanged, she repeats herself, word finding difficulty still problematic for her.  She can follow commands verbally more easily than what she reads, it still all curtailed.  She has no clue who I am, she even has difficulty remembering her  and daughter's names though she knows who they are and if given enough time and some help, she actually gets the names correctly.    PERRLA/EOMI, visual fields are full, there is mild right facial droop, smile is symmetric.  There is no bulbar dysfunction.    Musculoskeletal exam again reveals essentially no real focality other than some right sided reflex predominance, unchanged.  She is slightly unsteady when she walks but does not require an assistive device.  There is still some stiffness with the left leg, left arm swing is still diminished.  Sensory exam is deferred.     Assessment/Plan:     1. complex partial seizure  Clinically stable, I still believe the events that she has had intermittently in the past are ictal in nature, we will wait for the EEG to see if focality can be determined.  In either case, she will stay on Vimpat 50 mg, twice daily.  Risk of seizure recurrence is far too high to take her off drug.  Rationale for this was reviewed.  Everyone is quite comfortable with this.    2. 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  He understands the impulsiveness, he will need to keep an eye on her because of her lack of insight and tendency to wander.  Overall she seems to be quite happy.  She is eating well.    3. 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  They will continue the statin agent.  The rationale for this was reviewed.  We will follow-up in 1 year.    Time: 25 minutes spent face-to-face for exam, review, discussion, and education, of this over 70% of the time spent counseling and coordinating care.

## 2019-09-19 ENCOUNTER — OFFICE VISIT (OUTPATIENT)
Dept: MEDICAL GROUP | Age: 84
End: 2019-09-19
Payer: MEDICARE

## 2019-09-19 VITALS
TEMPERATURE: 97 F | WEIGHT: 130.4 LBS | HEART RATE: 76 BPM | BODY MASS INDEX: 22.26 KG/M2 | SYSTOLIC BLOOD PRESSURE: 112 MMHG | OXYGEN SATURATION: 96 % | HEIGHT: 64 IN | DIASTOLIC BLOOD PRESSURE: 66 MMHG

## 2019-09-19 DIAGNOSIS — F01.518 MIXED CORTICAL AND SUBCORTICAL VASCULAR DEMENTIA WITH BEHAVIORAL DISTURBANCE (HCC): ICD-10-CM

## 2019-09-19 DIAGNOSIS — I10 ESSENTIAL HYPERTENSION: Primary | ICD-10-CM

## 2019-09-19 DIAGNOSIS — M81.0 AGE-RELATED OSTEOPOROSIS WITHOUT CURRENT PATHOLOGICAL FRACTURE: ICD-10-CM

## 2019-09-19 DIAGNOSIS — I69.30 HISTORY OF HEMORRHAGIC CEREBROVASCULAR ACCIDENT (CVA) WITH RESIDUAL DEFICIT: ICD-10-CM

## 2019-09-19 DIAGNOSIS — E78.00 PURE HYPERCHOLESTEROLEMIA: ICD-10-CM

## 2019-09-19 DIAGNOSIS — Z23 NEED FOR VACCINATION: ICD-10-CM

## 2019-09-19 DIAGNOSIS — Z86.79 HISTORY OF SUBARACHNOID HEMORRHAGE: ICD-10-CM

## 2019-09-19 DIAGNOSIS — R56.9 SEIZURE (HCC): ICD-10-CM

## 2019-09-19 PROBLEM — R73.01 IMPAIRED FASTING BLOOD SUGAR: Status: ACTIVE | Noted: 2019-09-19

## 2019-09-19 PROBLEM — I61.1 NONTRAUMATIC CORTICAL HEMORRHAGE OF LEFT CEREBRAL HEMISPHERE (HCC): Status: RESOLVED | Noted: 2019-05-15 | Resolved: 2019-09-19

## 2019-09-19 PROCEDURE — 99214 OFFICE O/P EST MOD 30 MIN: CPT | Mod: 25 | Performed by: FAMILY MEDICINE

## 2019-09-19 PROCEDURE — G0008 ADMIN INFLUENZA VIRUS VAC: HCPCS | Performed by: FAMILY MEDICINE

## 2019-09-19 PROCEDURE — 90662 IIV NO PRSV INCREASED AG IM: CPT | Performed by: FAMILY MEDICINE

## 2019-09-19 RX ORDER — ROSUVASTATIN CALCIUM 20 MG/1
20 TABLET, COATED ORAL EVERY EVENING
Qty: 90 TAB | Refills: 3 | Status: SHIPPED | OUTPATIENT
Start: 2019-09-19 | End: 2020-11-02

## 2019-09-19 RX ORDER — ATENOLOL 25 MG/1
12.5 TABLET ORAL DAILY
Qty: 90 TAB | Refills: 1 | Status: SHIPPED | OUTPATIENT
Start: 2019-09-19 | End: 2020-03-19

## 2019-09-19 RX ORDER — LOSARTAN POTASSIUM 100 MG/1
100 TABLET ORAL DAILY
Qty: 90 TAB | Refills: 3 | Status: SHIPPED | OUTPATIENT
Start: 2019-09-19 | End: 2020-05-04

## 2019-09-19 NOTE — PROGRESS NOTES
Subjective:   CC: Hypertension follow-up    HPI:     Ruchi Camacho is a 85 y.o. female who is an established patient of the clinic, presents with the following concerns:     Patient is followed by Dr. Soria and was last seen on 9/17/19. She has a known history of seizures for which she takes Vimpat 50 mg BID, and tolerates this well. Her last seizure was over two years ago prior to being started on anti-epileptic therapy. She also has a history of cerebral hemorrhage with residual deficits. She also has a history of dementia with behavioral disturbance.   describes this as impulsiveness and that she is angered easily. She also has problems with wandering, repeats herself frequently, and her moment to moment.  Patient continue to take her medication as directed by her .  She is still agreeable to basic self-care.    Patient has a known history of age related osteoporosis.  Patient was taking Fosamax in the past.  However, she was not able to tolerate side effects.  There is a questionable concerns about Fosamax role in her previous cerebral hemorrhage.  Patient fell several times in the past without sustained injury.  Physical therapy was also offered to help reduce fall risks, however this was declined.    Patient has a history of chronic hypertension and dyslipidemia. She is complaint with these medications and tolerates them well.    Patient will receive her influenza vaccination today in the office.    Current medicines (including changes today)  Current Outpatient Medications   Medication Sig Dispense Refill   • atenolol (TENORMIN) 25 MG Tab Take 0.5 Tabs by mouth every day. 90 Tab 1   • losartan (COZAAR) 100 MG Tab Take 1 Tab by mouth every day. 90 Tab 3   • rosuvastatin (CRESTOR) 20 MG Tab Take 1 Tab by mouth every evening. 90 Tab 3   • denosumab (PROLIA) 60 MG/ML Solution Prefilled Syringe Inject 1 mL as instructed every 6 months for 2 doses. 2 mL 0   • lacosamide (VIMPAT) 50 MG Tab tablet  "Take 1 Tab by mouth 2 Times a Day for 180 days. 180 Tab 3   • calcium carbonate (TUMS) 500 MG Chew Tab Take 500 mg by mouth every day. BID     • Cholecalciferol (VITAMIN D3) 2000 UNIT Cap Take  by mouth.       No current facility-administered medications for this visit.      She  has a past medical history of Seizure (HCC).    I personally reviewed patient's problem list, allergies, medications, family hx, social hx with patient and update EPIC.     REVIEW OF SYSTEMS:  CONSTITUTIONAL:  Denies night sweats, fatigue, malaise, lethargy, fever or chills.  RESPIRATORY:  Denies cough, wheeze, hemoptysis, or shortness of breath.  CARDIOVASCULAR:  Denies chest pains, palpitations, pedal edema     Objective:     /66   Pulse 76   Temp 36.1 °C (97 °F) (Temporal)   Ht 1.626 m (5' 4\")   Wt 59.1 kg (130 lb 6.4 oz)   SpO2 96%  Body mass index is 22.38 kg/m².    Physical Exam:  Constitutional: awake, alert, in no distress.  Skin: Warm, dry, good turgor, no rashes, bruises, ulcers in visible areas.  Eye: conjunctiva clear, lids neg for edema or lesions.  ENMT: TM and auditory canals wnl. Oral and nasal mucosa wnl. Lips without lesions, good dentition, oropharynx clear.  Neck: Trachea midline, no masses, no thyromegaly. No cervical or supraclavicular lymphadenopathy  Respiratory: Unlabored respiratory effort, lungs clear to auscultation, no wheezes, no rales.  Cardiovascular: Normal S1, S2, no murmur, no pedal edema.  Psych: Oriented x3, affect and mood wnl, intact judgement and insight.       Assessment and Plan:   The following treatment plan was discussed    1. Essential hypertension  Chronic, controlled with atenolol and losartan, no side effect reported, will continue  - atenolol (TENORMIN) 25 MG Tab; Take 0.5 Tabs by mouth every day.  Dispense: 90 Tab; Refill: 1  - losartan (COZAAR) 100 MG Tab; Take 1 Tab by mouth every day.  Dispense: 90 Tab; Refill: 3    2. complex partial seizure  3. History of hemorrhagic " cerebrovascular accident (CVA) with residual deficit x2  4. History of subarachnoid hemorrhage  5. 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  -Follow-up with neurology as directed  -Continue Vimpat as directed    6. Pure hypercholesterolemia  Chronic, controlled with Crestor 20 mg daily, no side effect reported, will continue  - CBC WITH DIFFERENTIAL; Future  - Comp Metabolic Panel; Future  - Lipid Profile; Future  - rosuvastatin (CRESTOR) 20 MG Tab; Take 1 Tab by mouth every evening.  Dispense: 90 Tab; Refill: 3    7. Need for vaccination  - Influenza Vaccine, High Dose (65+ Only)    8. Age-related osteoporosis without current pathological fracture  History of osteoporosis, previously taking Fosamax, but discontinued due to inability to tolerate side effects.  Patient has several falls without sustained injury.  I discussed treatment options with patient and her .  They agreed to Prolia.  - denosumab (PROLIA) 60 MG/ML Solution Prefilled Syringe; Inject 1 mL as instructed every 6 months for 2 doses.  Dispense: 2 mL; Refill: 0    Lisa Pratt M.D.    Followup: Return in about 6 months (around 3/19/2020) for Annual wellness visit.    Please note that this dictation was created using voice recognition software and/or scribes. I have made every reasonable attempt to correct obvious errors, but I expect that there are errors of grammar and possibly content that I did not discover before finalizing the note.     Yg SOSA (Ozibhemalatha), am scribing for, and in the presence of, Lisa Pratt M.D.    Electronically signed by: Yg Henriquez (Ozibhemalatha), 9/19/2019    Lisa SOSA M.D. personally performed the services described in this documentation, as scribed by Yg Henriquez in my presence, and it is both accurate and complete.

## 2019-09-25 ENCOUNTER — NON-PROVIDER VISIT (OUTPATIENT)
Dept: NEUROLOGY | Facility: MEDICAL CENTER | Age: 84
End: 2019-09-25
Payer: MEDICARE

## 2019-09-25 DIAGNOSIS — R56.9 SEIZURE (HCC): ICD-10-CM

## 2019-09-25 PROCEDURE — 95951 EEG: CPT | Mod: 52 | Performed by: PSYCHIATRY & NEUROLOGY

## 2019-09-25 NOTE — PROCEDURES
VIDEO ELECTROENCEPHALOGRAM REPORT      Referring provider: Dr. Pratt    DOS: September 25, 2019 of 30-minute duration.    INDICATION:  Ruchi Camacho 85 y.o. female presenting with a history of complex partial seizures, stable, EEG study being requested as new baseline on new antiepileptic treatment.    CURRENT ANTIEPILEPTIC REGIMEN: Vimpat 50 mg, twice daily.    TECHNIQUE: 30 channel video electroencephalogram (EEG) was performed in accordance with the international 10-20 system. The study was reviewed in bipolar and referential montages. The recording examined the patient during wakeful and drowsy state(s).     DESCRIPTION OF THE RECORD:  During the wakefulness, the background showed a symmetrical 6-7 theta frequency posteriorly with amplitude of 70 mV.  This does not respond to eye opening and closure.  There is no alpha frequency seen when the patient is awake and with her eyes closed.  No focal slowing is seen.  With drowsiness, the slowing is little bit more persistent, maintained at 5-6 Hz.  Rare sharply contoured theta discharge is seen with a phase reversal over T3.  No sustained epileptiform discharges are seen, nothing suggestive of nonconvulsive seizure activity.  The patient does not achieve sleep for the tracings duration.    ACTIVATION PROCEDURES:   Hyperventilation was not performed. Intermittent Photic stimulation was performed in a stepwise fashion from 1 to 30 Hz and elicited no driving response at any frequency of stimulation.  No paroxysmal activity is seen.  No activation occurs.    ICTAL AND/OR INTERICTAL FINDINGS:   No focal or generalized epileptiform activity noted. No regional slowing was seen during this routine study.  No clinical events or seizures were reported or recorded during the study.     EKG: sampling of the EKG recording demonstrated sinus rhythm.     EVENTS: None    INTERPRETATION:  This is an abnormal EEG in the awake and drowsy states, consistent with mild bilateral  cerebral dysfunction, rare left mid temporal, sharply contoured theta discharges occur, without clear paroxysmal quality.    Note: A normal EEG does not rule out epilepsy.  If the clinical suspicion remains high for seizures, a prolonged recording to capture clinical or subclinical events may be helpful.        Elias Roper M.D.

## 2019-10-02 ENCOUNTER — TELEPHONE (OUTPATIENT)
Dept: NEUROLOGY | Facility: MEDICAL CENTER | Age: 84
End: 2019-10-02

## 2019-10-03 NOTE — TELEPHONE ENCOUNTER
----- Message from Sagar Carpio Ass't sent at 10/1/2019 12:13 PM PDT -----  Regarding: FW: Prescription Question  Contact: [phone]   You're thoughts  ----- Message -----  From: Ruchi Camacho  Sent: 9/30/2019   6:52 PM PDT  To: [facility]  Subject: Prescription Question                            Dr Pratt has presribed and scheduled an infusion procedure using Prolia. I looked up side effects for the drug and they appear to be much like Fosamax which she was on shortly before ehe had brain bleed in 2008. She had several of the side effects then, severe headaches, jawbone/teeth pain mistaken as need for oral surgery, finally memory problems and extreme high blood pressure. I told Dr. Pratt I planned to discuss this with you. As of now after looking at possible side effects i don't think she shou;d have the Prolia. What do you think??  Raul Camacho

## 2019-10-03 NOTE — TELEPHONE ENCOUNTER
Message from the patient's  is a little confusing.  We do not typically cancel treatments that are prescribed by other physicians.  It would be a little unusual that our office would have canceled her Prolia infusion.  As for side effects, I cannot predict these, they need to talk with Dr. Pratt in greater depth about the concerns.  There is no associated history of memory loss, stroke, hemorrhage, etc. associated with Prolia.  There is no neurologic contraindication to obtaining Prolia.

## 2019-10-04 ENCOUNTER — APPOINTMENT (OUTPATIENT)
Dept: ONCOLOGY | Facility: MEDICAL CENTER | Age: 84
End: 2019-10-04
Attending: FAMILY MEDICINE
Payer: MEDICARE

## 2019-10-04 NOTE — TELEPHONE ENCOUNTER
Called and spoke to the patient's  about the Prolia. Advised per Dr. Roper's note that the concerns with the side effects should be discussed in further detail with Dr. Pratt. Patient agreed.

## 2019-10-11 ENCOUNTER — HOSPITAL ENCOUNTER (EMERGENCY)
Facility: MEDICAL CENTER | Age: 84
End: 2019-10-11
Attending: EMERGENCY MEDICINE
Payer: MEDICARE

## 2019-10-11 VITALS
HEART RATE: 63 BPM | BODY MASS INDEX: 22.58 KG/M2 | HEIGHT: 64 IN | RESPIRATION RATE: 16 BRPM | OXYGEN SATURATION: 99 % | DIASTOLIC BLOOD PRESSURE: 71 MMHG | WEIGHT: 132.28 LBS | SYSTOLIC BLOOD PRESSURE: 132 MMHG | TEMPERATURE: 98.4 F

## 2019-10-11 DIAGNOSIS — H57.89 PERIORBITAL SWELLING: ICD-10-CM

## 2019-10-11 DIAGNOSIS — R21 FACIAL RASH: ICD-10-CM

## 2019-10-11 PROCEDURE — 700101 HCHG RX REV CODE 250

## 2019-10-11 PROCEDURE — 99284 EMERGENCY DEPT VISIT MOD MDM: CPT

## 2019-10-11 RX ORDER — CEPHALEXIN 500 MG/1
500 CAPSULE ORAL 4 TIMES DAILY
Qty: 40 CAP | Refills: 0 | Status: SHIPPED | OUTPATIENT
Start: 2019-10-11 | End: 2019-10-21

## 2019-10-11 RX ORDER — ACYCLOVIR 400 MG/1
800 TABLET ORAL
Qty: 70 TAB | Refills: 0 | Status: SHIPPED | OUTPATIENT
Start: 2019-10-11 | End: 2019-10-14

## 2019-10-11 RX ORDER — CEPHALEXIN 500 MG/1
500 CAPSULE ORAL 4 TIMES DAILY
Qty: 40 CAP | Refills: 0 | Status: SHIPPED | OUTPATIENT
Start: 2019-10-11 | End: 2019-10-11 | Stop reason: SDUPTHER

## 2019-10-11 RX ADMIN — FLUORESCEIN SODIUM 1 STRIP: 1 STRIP OPHTHALMIC at 14:45

## 2019-10-11 NOTE — ED NOTES
"Pt c/o left facial/eye pain and swelling since yesterday. Pt has small reddened area above left eye. States left eye \"wouldn't open\" this morning. Notes vision is blurry \"sometimes.\" Pt denies eye drainage/discharge.  thinks pt may have hit her head, and \"just doesn't remember.\" Pt has history of TIA, \"brain bleed\" x2, and seizures. Pt/family denies recent seizure activity/fever.   "

## 2019-10-11 NOTE — ED PROVIDER NOTES
"ED Provider Note    CHIEF COMPLAINT  Chief Complaint   Patient presents with   • Eye Swelling     left       HPI  Ruchi Camacho is a 85 y.o. female who presents for evaluation of swelling and redness surrounding her left eye that has begun within the past 24 hours, initially she states there was some pain involving the left side of her scalp.  She does not report any changes in her vision although the patient does have a history of memory deficits and family is providing much of the history.  There is no fever, no vomiting, no headache.  No other complaints offered by the patient or the family at this time    REVIEW OF SYSTEMS  Negative for fever, chest pain, dyspnea, abdominal pain, back pain.    PAST MEDICAL HISTORY   has a past medical history of Brain bleed (HCC) (2008) and Seizure (HCC).    SOCIAL HISTORY  Social History     Tobacco Use   • Smoking status: Never Smoker   • Smokeless tobacco: Never Used   Substance and Sexual Activity   • Alcohol use: No   • Drug use: No   • Sexual activity: Not Currently     Partners: Male       SURGICAL HISTORY  patient denies any surgical history    CURRENT MEDICATIONS  I personally reviewed the medication list in the charting documentation.     ALLERGIES  No Known Allergies    PHYSICAL EXAM  VITAL SIGNS: /71   Pulse 63   Temp 36.9 °C (98.4 °F) (Tympanic)   Resp 16   Ht 1.626 m (5' 4\")   Wt 60 kg (132 lb 4.4 oz)   SpO2 (!) 87%   BMI 22.71 kg/m²   Constitutional: Alert in no apparent distress.  HENT: Inspection of the head and face reveals left forehead and periorbital erythema with no vesicular lesions, no tenderness.  Eyes: Conjunctiva normal, Non-icteric.  Fluorescein staining reveals no dendritic-like defects of the cornea, no corneal abrasions or ulcerations.  At the 6 o'clock position of the cornea there is a small hypertrophic structure protruding from the cornea  Chest: Normal nonlabored respirations  Musculoskeletal: Good range of motion in all major " joints.   Neurologic: Alert, No focal deficits noted.   Psychiatric: Affect normal, Judgment normal.    COURSE & MEDICAL DECISION MAKING  Pertinent Labs & Imaging studies reviewed. (See chart for details)    Encounter Summary: This is a 85 y.o. female with what seems to be most consistent with left-sided facial cellulitis including the periorbital region although early shingles is a possibility as this is unilateral.  No evidence of herpetic lesions within the eye although there is some sort of nonspecific hypertrophic-like structure in the cornea for which she should follow-up with her primary care provider about.  With regard to this rash I will treat her with acyclovir and Keflex.  The plan is to use the acyclovir for up to 48 hours and if she does not develop any herpetic appearing vesicular lesion she will discontinue this and I have educated the family on what to look for.  She will finish the whole course of antibiotics, strict return instructions have been discussed as well as the importance of following up with the primary care provider by Monday at the latest.      DISPOSITION: Discharge Home      FINAL IMPRESSION  1. Facial rash    2. Periorbital swelling        This dictation was created using voice recognition software. The accuracy of the dictation is limited to the abilities of the software. I expect there may be some errors of grammar and possibly content. The nursing notes were reviewed and certain aspects of this information were incorporated into this note.    Electronically signed by: Hank Vizcaino, 10/11/2019 2:51 PM

## 2019-10-14 ENCOUNTER — OFFICE VISIT (OUTPATIENT)
Dept: MEDICAL GROUP | Age: 84
End: 2019-10-14
Payer: MEDICARE

## 2019-10-14 VITALS
DIASTOLIC BLOOD PRESSURE: 56 MMHG | TEMPERATURE: 97.9 F | HEIGHT: 64 IN | SYSTOLIC BLOOD PRESSURE: 112 MMHG | OXYGEN SATURATION: 99 % | WEIGHT: 130 LBS | BODY MASS INDEX: 22.2 KG/M2 | HEART RATE: 70 BPM

## 2019-10-14 DIAGNOSIS — H02.846 SWELLING OF EYELID, LEFT: Primary | ICD-10-CM

## 2019-10-14 DIAGNOSIS — M81.0 AGE-RELATED OSTEOPOROSIS WITHOUT CURRENT PATHOLOGICAL FRACTURE: ICD-10-CM

## 2019-10-14 DIAGNOSIS — Z98.49 HISTORY OF CATARACT EXTRACTION, UNSPECIFIED LATERALITY: ICD-10-CM

## 2019-10-14 DIAGNOSIS — R21 FACIAL RASH: ICD-10-CM

## 2019-10-14 PROCEDURE — 99214 OFFICE O/P EST MOD 30 MIN: CPT | Performed by: FAMILY MEDICINE

## 2019-10-14 ASSESSMENT — PAIN SCALES - GENERAL: PAINLEVEL: NO PAIN
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
## 2019-11-05 ENCOUNTER — APPOINTMENT (OUTPATIENT)
Dept: RADIOLOGY | Facility: MEDICAL CENTER | Age: 84
End: 2019-11-05
Attending: EMERGENCY MEDICINE
Payer: MEDICARE

## 2019-11-05 ENCOUNTER — HOSPITAL ENCOUNTER (EMERGENCY)
Facility: MEDICAL CENTER | Age: 84
End: 2019-11-05
Attending: EMERGENCY MEDICINE
Payer: MEDICARE

## 2019-11-05 VITALS
DIASTOLIC BLOOD PRESSURE: 64 MMHG | RESPIRATION RATE: 16 BRPM | TEMPERATURE: 99.5 F | SYSTOLIC BLOOD PRESSURE: 129 MMHG | OXYGEN SATURATION: 96 % | HEART RATE: 75 BPM

## 2019-11-05 DIAGNOSIS — R41.89 COGNITIVE AND BEHAVIORAL CHANGES: ICD-10-CM

## 2019-11-05 DIAGNOSIS — Z86.79 HISTORY OF INTRACRANIAL HEMORRHAGE: ICD-10-CM

## 2019-11-05 DIAGNOSIS — N30.00 ACUTE CYSTITIS WITHOUT HEMATURIA: ICD-10-CM

## 2019-11-05 DIAGNOSIS — F03.91 DEMENTIA WITH BEHAVIORAL DISTURBANCE, UNSPECIFIED DEMENTIA TYPE: ICD-10-CM

## 2019-11-05 DIAGNOSIS — R46.89 COGNITIVE AND BEHAVIORAL CHANGES: ICD-10-CM

## 2019-11-05 DIAGNOSIS — Z87.898 HISTORY OF SEIZURES: ICD-10-CM

## 2019-11-05 DIAGNOSIS — E86.0 DEHYDRATION: ICD-10-CM

## 2019-11-05 LAB
ALBUMIN SERPL BCP-MCNC: 3.8 G/DL (ref 3.2–4.9)
ALBUMIN/GLOB SERPL: 1.3 G/DL
ALP SERPL-CCNC: 51 U/L (ref 30–99)
ALT SERPL-CCNC: 8 U/L (ref 2–50)
ANION GAP SERPL CALC-SCNC: 8 MMOL/L (ref 0–11.9)
APPEARANCE UR: CLEAR
AST SERPL-CCNC: 17 U/L (ref 12–45)
BACTERIA #/AREA URNS HPF: ABNORMAL /HPF
BASOPHILS # BLD AUTO: 0.6 % (ref 0–1.8)
BASOPHILS # BLD: 0.06 K/UL (ref 0–0.12)
BILIRUB SERPL-MCNC: 0.9 MG/DL (ref 0.1–1.5)
BILIRUB UR QL STRIP.AUTO: NEGATIVE
BUN SERPL-MCNC: 26 MG/DL (ref 8–22)
CALCIUM SERPL-MCNC: 8.9 MG/DL (ref 8.5–10.5)
CHLORIDE SERPL-SCNC: 108 MMOL/L (ref 96–112)
CO2 SERPL-SCNC: 23 MMOL/L (ref 20–33)
COLOR UR: YELLOW
CREAT SERPL-MCNC: 0.77 MG/DL (ref 0.5–1.4)
EOSINOPHIL # BLD AUTO: 0.01 K/UL (ref 0–0.51)
EOSINOPHIL NFR BLD: 0.1 % (ref 0–6.9)
EPI CELLS #/AREA URNS HPF: NEGATIVE /HPF
ERYTHROCYTE [DISTWIDTH] IN BLOOD BY AUTOMATED COUNT: 46.5 FL (ref 35.9–50)
GLOBULIN SER CALC-MCNC: 3 G/DL (ref 1.9–3.5)
GLUCOSE SERPL-MCNC: 105 MG/DL (ref 65–99)
GLUCOSE UR STRIP.AUTO-MCNC: NEGATIVE MG/DL
HCT VFR BLD AUTO: 36.2 % (ref 37–47)
HGB BLD-MCNC: 11.6 G/DL (ref 12–16)
HYALINE CASTS #/AREA URNS LPF: ABNORMAL /LPF
IMM GRANULOCYTES # BLD AUTO: 0.03 K/UL (ref 0–0.11)
IMM GRANULOCYTES NFR BLD AUTO: 0.3 % (ref 0–0.9)
INR PPP: 1.24 (ref 0.87–1.13)
KETONES UR STRIP.AUTO-MCNC: NEGATIVE MG/DL
LACTATE BLD-SCNC: 0.9 MMOL/L (ref 0.5–2)
LEUKOCYTE ESTERASE UR QL STRIP.AUTO: ABNORMAL
LIPASE SERPL-CCNC: 11 U/L (ref 11–82)
LYMPHOCYTES # BLD AUTO: 1.84 K/UL (ref 1–4.8)
LYMPHOCYTES NFR BLD: 19.6 % (ref 22–41)
MCH RBC QN AUTO: 30.7 PG (ref 27–33)
MCHC RBC AUTO-ENTMCNC: 32 G/DL (ref 33.6–35)
MCV RBC AUTO: 95.8 FL (ref 81.4–97.8)
MICRO URNS: ABNORMAL
MONOCYTES # BLD AUTO: 1.53 K/UL (ref 0–0.85)
MONOCYTES NFR BLD AUTO: 16.3 % (ref 0–13.4)
NEUTROPHILS # BLD AUTO: 5.94 K/UL (ref 2–7.15)
NEUTROPHILS NFR BLD: 63.1 % (ref 44–72)
NITRITE UR QL STRIP.AUTO: POSITIVE
NRBC # BLD AUTO: 0 K/UL
NRBC BLD-RTO: 0 /100 WBC
PH UR STRIP.AUTO: 5.5 [PH] (ref 5–8)
PLATELET # BLD AUTO: 171 K/UL (ref 164–446)
PMV BLD AUTO: 10.4 FL (ref 9–12.9)
POTASSIUM SERPL-SCNC: 4.2 MMOL/L (ref 3.6–5.5)
PROT SERPL-MCNC: 6.8 G/DL (ref 6–8.2)
PROT UR QL STRIP: NEGATIVE MG/DL
PROTHROMBIN TIME: 15.9 SEC (ref 12–14.6)
RBC # BLD AUTO: 3.78 M/UL (ref 4.2–5.4)
RBC # URNS HPF: ABNORMAL /HPF
RBC UR QL AUTO: ABNORMAL
SODIUM SERPL-SCNC: 139 MMOL/L (ref 135–145)
SP GR UR STRIP.AUTO: 1.02
UROBILINOGEN UR STRIP.AUTO-MCNC: 0.2 MG/DL
WBC # BLD AUTO: 9.4 K/UL (ref 4.8–10.8)
WBC #/AREA URNS HPF: ABNORMAL /HPF

## 2019-11-05 PROCEDURE — 85610 PROTHROMBIN TIME: CPT

## 2019-11-05 PROCEDURE — 71045 X-RAY EXAM CHEST 1 VIEW: CPT

## 2019-11-05 PROCEDURE — 700101 HCHG RX REV CODE 250: Performed by: EMERGENCY MEDICINE

## 2019-11-05 PROCEDURE — 87077 CULTURE AEROBIC IDENTIFY: CPT

## 2019-11-05 PROCEDURE — 94760 N-INVAS EAR/PLS OXIMETRY 1: CPT

## 2019-11-05 PROCEDURE — 81001 URINALYSIS AUTO W/SCOPE: CPT

## 2019-11-05 PROCEDURE — 96365 THER/PROPH/DIAG IV INF INIT: CPT

## 2019-11-05 PROCEDURE — 83605 ASSAY OF LACTIC ACID: CPT

## 2019-11-05 PROCEDURE — 85025 COMPLETE CBC W/AUTO DIFF WBC: CPT

## 2019-11-05 PROCEDURE — 99285 EMERGENCY DEPT VISIT HI MDM: CPT

## 2019-11-05 PROCEDURE — 87186 SC STD MICRODIL/AGAR DIL: CPT

## 2019-11-05 PROCEDURE — 87040 BLOOD CULTURE FOR BACTERIA: CPT | Mod: 91

## 2019-11-05 PROCEDURE — 70450 CT HEAD/BRAIN W/O DYE: CPT

## 2019-11-05 PROCEDURE — 80053 COMPREHEN METABOLIC PANEL: CPT

## 2019-11-05 PROCEDURE — 87086 URINE CULTURE/COLONY COUNT: CPT

## 2019-11-05 PROCEDURE — 700111 HCHG RX REV CODE 636 W/ 250 OVERRIDE (IP): Performed by: EMERGENCY MEDICINE

## 2019-11-05 PROCEDURE — 83690 ASSAY OF LIPASE: CPT

## 2019-11-05 PROCEDURE — 700105 HCHG RX REV CODE 258: Performed by: EMERGENCY MEDICINE

## 2019-11-05 RX ORDER — SODIUM CHLORIDE 9 MG/ML
1000 INJECTION, SOLUTION INTRAVENOUS ONCE
Status: COMPLETED | OUTPATIENT
Start: 2019-11-05 | End: 2019-11-05

## 2019-11-05 RX ORDER — GRANULES FOR ORAL 3 G/1
3 POWDER ORAL ONCE
Status: COMPLETED | OUTPATIENT
Start: 2019-11-05 | End: 2019-11-05

## 2019-11-05 RX ADMIN — FOSFOMYCIN TROMETHAMINE 3 G: 3 POWDER ORAL at 07:48

## 2019-11-05 RX ADMIN — SODIUM CHLORIDE 1000 ML: 9 INJECTION, SOLUTION INTRAVENOUS at 04:23

## 2019-11-05 RX ADMIN — CEFTRIAXONE SODIUM 1 G: 1 INJECTION, POWDER, FOR SOLUTION INTRAMUSCULAR; INTRAVENOUS at 04:24

## 2019-11-05 NOTE — DISCHARGE INSTRUCTIONS
You were seen and evaluated in the Emergency Department at [facility] for:     Foul smelling urine and behavior changes    You had the following tests and studies:    Thankfully her blood tests and brain scan are all stable, as are her vital signs. She does have a urine infection.     You received the following medications:    Fosfomycin, an antibiotic.   ----------------------------    Please make sure to follow up with:    Your primary care provider for recheck and routine health care, but please come right back to the hospital if she gets any worse!    Good luck, we hope she gets better soon!  ----------------------------    We always encourage patients to return IMMEDIATELY if they have:  Increased or changing pain, passing out, fevers over 100.4 (taken in your mouth or rectally) for more than 2 days, redness or swelling of skin or tissues, feeling like your heart is beating fast, chest pain that is new or worsening, trouble breathing, feeling like your throat is closing up and can not breath, inability to walk, weakness of any part of your body, new dizziness, severe bleeding that won't stop from any part of your body, if you can't eat or drink, or if you have any other concerns.   If you feel worse, please know that you can always return with any questions, concerns, worse symptoms, or you are feeling unsafe. We certainly cannot say for sure that we have ruled out every illness or dangerous disease, but we feel that at this specific time, your exam, tests, and vital signs like heart rate and blood pressure are safe for discharge.

## 2019-11-05 NOTE — LETTER
11/10/2019               Ruchi Camacho  [address] [address] [facility] [facility] [address]        Dear Ruchi (MR#[id number])    This letter is sent in regards to your, recent visit to the [facility] Emergency Department on 11/5/2019.  During the visit, tests were performed to assist the physician in a medical diagnosis.  A review of those tests requires that we notify you of the following:    Your urine culture was POSITIVE for a bacteria called Escherichia coli. The antibiotic given to you in the ED (fosfomycin) should be active to treat this bacteria. If you are not feeling better, please return to the ED to be evaluated.      Please feel free to contact me at the number below if you have any questions or concerns. Thank you for your cooperation in the matter.    Sincerely,  ED Culture Follow-Up Staff  Vanessa Maya, PharmD    [facility], Emergency Department  [facility], [place] [address] [phone]

## 2019-11-05 NOTE — ED NOTES
Patient leaves with spouse. Patient has stable vital signs. All discharge paperwork went over with . Patient wheeled to lobby and helped into car.

## 2019-11-05 NOTE — ED PROVIDER NOTES
"ED PROVIDER NOTE     Scribed for Bhaskar Reynaga M.D. by Cirilo Vickers. 11/5/2019, 3:21 AM.    CHIEF COMPLAINT  Chief Complaint   Patient presents with   • Altered Mental Status     x4 days       HPI    Primary care provider: Lisa Pratt M.D.  Means of arrival: Ambulance   History obtained from: Patient's    History limited by: Altered mental status/baseline dementia    Ruchi Camacho is a 85 y.o. female who presents with altered mental status onset 4 days. Patient's  states patient \"has become more altered\" since time of onset. He adds that patient has foul-smelling urine and he's worried she's got a urine infection.   states that patient has been more easily agitated than usual. She has not had any vomiting or syncope or cough or difficulty breathing. No known falls or injuries. No fevers at home. No alleviating measures attempted by  other than redirection for her mood changes.  She has a history of intracranial hemorrhage and seizure disorder on Vimpat.  She also has a history of dementia.    Further HPI limited secondary to altered mental status with baseline dementia.     REVIEW OF SYSTEMS  Constitutional: Negative for fever or shaking chills.  HENT: Negative for rhinorrhea or drooling.  Respiratory: Negative for cough or trouble breathing.  Cardiovascular: Negative for syncope or leg swelling.   Gastrointestinal: Negative for vomiting or diarrhea.  Genitourinary: positive for foul-smelling and darker urine than normal.   Skin: Negative for itching or rash.   Psychiatric/Behavioral: Positive for increased agitation.  Unobtainable ROS limited secondary to altered mental status.     PAST MEDICAL HISTORY   has a past medical history of Brain bleed (HCC) (2008) and Seizure (HCC).    PAST FAMILY HISTORY  Family History   Problem Relation Age of Onset   • Heart Failure Mother    • Stroke Father    • Heart Failure Father    • Cancer Sister        SOCIAL HISTORY  Social History "     Tobacco Use   • Smoking status: Never Smoker   • Smokeless tobacco: Never Used   Substance and Sexual Activity   • Alcohol use: No   • Drug use: No   • Sexual activity: Not Currently     Partners: Male       SURGICAL HISTORY  patient denies any surgical history    CURRENT MEDICATIONS  No current facility-administered medications on file prior to encounter.      Current Outpatient Medications on File Prior to Encounter   Medication Sig Dispense Refill   • Probiotic Product (PROBIOTIC ADVANCED PO) Take  by mouth.     • denosumab (PROLIA) 60 MG/ML Solution Prefilled Syringe Inject 1 mL as instructed every 6 months. 2 mL 0   • atenolol (TENORMIN) 25 MG Tab Take 0.5 Tabs by mouth every day. 90 Tab 1   • losartan (COZAAR) 100 MG Tab Take 1 Tab by mouth every day. 90 Tab 3   • rosuvastatin (CRESTOR) 20 MG Tab Take 1 Tab by mouth every evening. 90 Tab 3   • lacosamide (VIMPAT) 50 MG Tab tablet Take 1 Tab by mouth 2 Times a Day for 180 days. 180 Tab 3   • calcium carbonate (TUMS) 500 MG Chew Tab Take 500 mg by mouth every day. BID     • Cholecalciferol (VITAMIN D3) 2000 UNIT Cap Take  by mouth.           ALLERGIES  No Known Allergies    PHYSICAL EXAM  VITAL SIGNS: /75   Pulse 72   Resp 16   SpO2 95%   Temp 37.4 degrees C  Pulse ox interpretation: On room air, I interpret this pulse ox as normal.  Constitutional: Chronically ill appearing, lying on the stretcher.  HEENT: Normocephalic, atraumatic. Posterior pharynx clear, mucous membranes dry  Eyes:  EOMI. Normal sclerae.  Neck: Supple, nontender.  Chest/Pulmonary: Slightly diminished to ausculation bilaterally, no wheezes or rhonchi.  Cardiovascular: Regular rate and rhythm, no obvious murmur.   Abdomen: Soft, nontender; no rebound, guarding, or masses.  Back: No CVA or midline tenderness.   Musculoskeletal: No deformity or edema.  Neuro: Alert, no focal weakness.  Psych: Agitated and uncooperative.   Skin: No rashes, warm and dry      DIAGNOSTIC STUDIES /  PROCEDURES    LABS & EKG  Results for orders placed or performed during the hospital encounter of 11/05/19   CBC WITH DIFFERENTIAL   Result Value Ref Range    WBC 9.4 4.8 - 10.8 K/uL    RBC 3.78 (L) 4.20 - 5.40 M/uL    Hemoglobin 11.6 (L) 12.0 - 16.0 g/dL    Hematocrit 36.2 (L) 37.0 - 47.0 %    MCV 95.8 81.4 - 97.8 fL    MCH 30.7 27.0 - 33.0 pg    MCHC 32.0 (L) 33.6 - 35.0 g/dL    RDW 46.5 35.9 - 50.0 fL    Platelet Count 171 164 - 446 K/uL    MPV 10.4 9.0 - 12.9 fL    Neutrophils-Polys 63.10 44.00 - 72.00 %    Lymphocytes 19.60 (L) 22.00 - 41.00 %    Monocytes 16.30 (H) 0.00 - 13.40 %    Eosinophils 0.10 0.00 - 6.90 %    Basophils 0.60 0.00 - 1.80 %    Immature Granulocytes 0.30 0.00 - 0.90 %    Nucleated RBC 0.00 /100 WBC    Neutrophils (Absolute) 5.94 2.00 - 7.15 K/uL    Lymphs (Absolute) 1.84 1.00 - 4.80 K/uL    Monos (Absolute) 1.53 (H) 0.00 - 0.85 K/uL    Eos (Absolute) 0.01 0.00 - 0.51 K/uL    Baso (Absolute) 0.06 0.00 - 0.12 K/uL    Immature Granulocytes (abs) 0.03 0.00 - 0.11 K/uL    NRBC (Absolute) 0.00 K/uL   COMP METABOLIC PANEL   Result Value Ref Range    Sodium 139 135 - 145 mmol/L    Potassium 4.2 3.6 - 5.5 mmol/L    Chloride 108 96 - 112 mmol/L    Co2 23 20 - 33 mmol/L    Anion Gap 8.0 0.0 - 11.9    Glucose 105 (H) 65 - 99 mg/dL    Bun 26 (H) 8 - 22 mg/dL    Creatinine 0.77 0.50 - 1.40 mg/dL    Calcium 8.9 8.5 - 10.5 mg/dL    AST(SGOT) 17 12 - 45 U/L    ALT(SGPT) 8 2 - 50 U/L    Alkaline Phosphatase 51 30 - 99 U/L    Total Bilirubin 0.9 0.1 - 1.5 mg/dL    Albumin 3.8 3.2 - 4.9 g/dL    Total Protein 6.8 6.0 - 8.2 g/dL    Globulin 3.0 1.9 - 3.5 g/dL    A-G Ratio 1.3 g/dL   LIPASE   Result Value Ref Range    Lipase 11 11 - 82 U/L   URINALYSIS (UA)   Result Value Ref Range    Color Yellow     Character Clear     Specific Gravity 1.018 <1.035    Ph 5.5 5.0 - 8.0    Glucose Negative Negative mg/dL    Ketones Negative Negative mg/dL    Protein Negative Negative mg/dL    Bilirubin Negative Negative     Urobilinogen, Urine 0.2 Negative    Nitrite Positive (A) Negative    Leukocyte Esterase Trace (A) Negative    Occult Blood Small (A) Negative    Micro Urine Req Microscopic    PROTHROMBIN TIME (INR)   Result Value Ref Range    PT 15.9 (H) 12.0 - 14.6 sec    INR 1.24 (H) 0.87 - 1.13   LACTIC ACID   Result Value Ref Range    Lactic Acid 0.9 0.5 - 2.0 mmol/L   ESTIMATED GFR   Result Value Ref Range    GFR If African American >60 >60 mL/min/1.73 m 2    GFR If Non African American >60 >60 mL/min/1.73 m 2   URINE MICROSCOPIC (W/UA)   Result Value Ref Range    WBC 5-10 (A) /hpf    RBC 0-2 /hpf    Bacteria Many (A) None /hpf    Epithelial Cells Negative /hpf    Hyaline Cast 0-2 /lpf     All labs reviewed by me.    RADIOLOGY  CT-HEAD W/O   Final Result         1.  No acute intracranial abnormality is identified, there are nonspecific white matter changes, commonly associated with small vessel ischemic disease.  Associated mild cerebral atrophy is noted.   2.  Atherosclerosis.      DX-CHEST-PORTABLE (1 VIEW)   Final Result         1.  No acute cardiopulmonary disease.   2.  Cardiomegaly   3.  Atherosclerosis   4.  Perihilar interstitial prominence and bronchial wall cuffing, appearance suggests changes of underlying bronchial inflammation, consider bronchitis.          COURSE & MEDICAL DECISION MAKING    Differential Diagnosis includes but is not limited to: UTI, dehydration, dementia, intracranial hemorrhage, sepsis    ED Course:    3:21 AM Patient seen at bedside. Ordered lab and imaging for further evaluation.  The patient will receive IV fluids for concerns of dehydration she has dry mucous membranes on examination, and she must be kept n.p.o. in case a surgical process is identified on work-up.    Thankfully the patient's work-up is quite reassuring.  She has no leukocytosis here.  She does not have critical anemia.  Electrolytes are stable no severe acidosis, slightly elevated BUN concordant with my suspicion for  dehydration.  LFTs and lipase are reassuring.  Lactic acid level is normal doubt severe sepsis.  Chest x-ray with no lobar pneumonia, thankfully CT head without any acute intracranial hemorrhage.    After fluids the patient seems to be resting more comfortably.  She does have strong evidence of UTI.  She was initially empirically treated with ceftriaxone, but I had a long discussion with her .  Since she has stable vital signs and labs at this time no signs of severe sepsis, I think she may be a reasonable candidate for outpatient therapy.  She has had some behavioral changes, so I offered admission for observation with  if he does not feel safe taking her home.  He feels that she would likely become delirious if she stays in the hospital in both he and she would likely rather have her treated at home.  To ease her treatment course given behavioral changes, I discussed with pharmacist and we will treat her with a dose of fosfomycin.   will safely transport the patient home and follow-up with her primary care provider later this week, however he will bring her back immediately for any worsening behavior changes or fevers or vomiting or mental status changes or any other new or worsening symptoms.      Medications   NS infusion 1,000 mL (0 mL Intravenous Stopped 11/5/19 0627)   cefTRIAXone (ROCEPHIN) 1 g in  mL IVPB (0 g Intravenous Stopped 11/5/19 0509)   fosfomycin (MONUROL) oral packet 3 g (3 g Oral Given 11/5/19 0748)     FINAL IMPRESSION  1. Acute cystitis without hematuria    2. Cognitive and behavioral changes    3. Dementia with behavioral disturbance, unspecified dementia type (HCC)    4. History of seizures    5. History of intracranial hemorrhage    6. Dehydration        PRESCRIPTIONS  Discharge Medication List as of 11/5/2019  8:08 AM          FOLLOW UP  [facility], Emergency Dept  [address] [id number] [phone]  Today  If you have ANY  new or worse symptoms!    Lisa Pratt M.D.  [address] [place] [address] [address] [place] [id number] [phone]    Schedule an appointment as soon as possible for a visit in 1 day  for recheck and routine health care      -DISCHARGE-      The patient is referred to her primary physician for blood pressure management, diabetic screening, and for all other preventative health concerns.     Pertinent Labs & Imaging studies reviewed and verified by myself, as well as nursing notes and the patient's past medical, family, and social histories (See chart for details).    Results, exam findings, clinical impression, presumed diagnosis, treatment options, and strict return precautions were discussed with the  of the patient who is her primary caregiver, and they verbalized understanding, agreed with, and appreciated the plan of care.    ICirilo (Jef), am scribing for, and in the presence of, Bhaskar Reynaga M.D..    Electronically signed by: Cirilo Vickers (Jef), 11/5/2019    IBhaskar M.D. personally performed the services described in this documentation, as scribed by Cirilo Vickers in my presence, and it is both accurate and complete.    Portions of this record were made with voice recognition software.  Despite my review, spelling/grammar/context errors may still remain.  Interpretation of this chart should be taken in this context.    C    The note accurately reflects work and decisions made by me.  Bhaskar Reynaga  11/5/2019  8:42 PM

## 2019-11-07 ENCOUNTER — OFFICE VISIT (OUTPATIENT)
Dept: MEDICAL GROUP | Age: 84
End: 2019-11-07
Payer: MEDICARE

## 2019-11-07 VITALS
SYSTOLIC BLOOD PRESSURE: 116 MMHG | HEIGHT: 64 IN | HEART RATE: 60 BPM | TEMPERATURE: 98 F | DIASTOLIC BLOOD PRESSURE: 74 MMHG | WEIGHT: 132 LBS | BODY MASS INDEX: 22.53 KG/M2 | OXYGEN SATURATION: 92 %

## 2019-11-07 DIAGNOSIS — R41.82 ALTERED MENTAL STATUS, UNSPECIFIED ALTERED MENTAL STATUS TYPE: ICD-10-CM

## 2019-11-07 DIAGNOSIS — N30.00 ACUTE CYSTITIS WITHOUT HEMATURIA: ICD-10-CM

## 2019-11-07 DIAGNOSIS — F01.50 VASCULAR DEMENTIA WITHOUT BEHAVIORAL DISTURBANCE (HCC): ICD-10-CM

## 2019-11-07 DIAGNOSIS — M81.0 AGE-RELATED OSTEOPOROSIS WITHOUT CURRENT PATHOLOGICAL FRACTURE: ICD-10-CM

## 2019-11-07 LAB
BACTERIA UR CULT: ABNORMAL
BACTERIA UR CULT: ABNORMAL
SIGNIFICANT IND 70042: ABNORMAL
SITE SITE: ABNORMAL
SOURCE SOURCE: ABNORMAL

## 2019-11-07 PROCEDURE — 99214 OFFICE O/P EST MOD 30 MIN: CPT | Performed by: FAMILY MEDICINE

## 2019-11-07 NOTE — PROGRESS NOTES
Subjective:   CC: altered mental status    HPI:     Ruchi Camacho is a 85 y.o. female who is an established patient of the clinic, presents with the following concerns:     Altered mental status, unspecified altered mental status type  Acute cystitis without hematuria  Patient is here for a follow up after being seen in the ED on 11/05/19 for altered mental status which was ultimately determined to be secondary to a UTI. The symptoms had been present for four days prior to being seen by ER. Pt did not have any urogenital symptoms except for dark and strong smelling. She was treated with ceftriaxone and fosfomycin in the ED and since her symptoms have improved and she is starting to feel better.    Vascular dementia without behavioral disturbance (HCC)  Chronic, patient has hx of vascular dementia following a previous stroke.  states that her cognitive function may be showing signs of worsening, however he is unsure if this is related to her recent UTI. He also notes that she has been having more frequent mood swings lately. Otherwise this remains stable.    Age-related osteoporosis without current pathological fracture  Chronic. DEXA scan performed in 9/18/18 was indicative of osteoporosis. She was taking Fosamax previously however discontinued this due to not being able to tolerate the side effects.  also suspects that Fosamax might have contributed to her previous stroke. At previous OV, I recommended Prolia. He wants to consult with her neurologist. Her  is now agreeable to Prolia. She is taking calcium and vitamin D daily. She has couple falls w/o sustained injuries.     Current medicines (including changes today)  Current Outpatient Medications   Medication Sig Dispense Refill   • denosumab (PROLIA) 60 MG/ML Solution Prefilled Syringe Inject 1 mL as instructed every 6 months for 2 doses. 2 mL 0   • Probiotic Product (PROBIOTIC ADVANCED PO) Take  by mouth.     • atenolol (TENORMIN) 25  "MG Tab Take 0.5 Tabs by mouth every day. 90 Tab 1   • losartan (COZAAR) 100 MG Tab Take 1 Tab by mouth every day. 90 Tab 3   • rosuvastatin (CRESTOR) 20 MG Tab Take 1 Tab by mouth every evening. 90 Tab 3   • lacosamide (VIMPAT) 50 MG Tab tablet Take 1 Tab by mouth 2 Times a Day for 180 days. 180 Tab 3   • calcium carbonate (TUMS) 500 MG Chew Tab Take 500 mg by mouth every day. BID     • Cholecalciferol (VITAMIN D3) 2000 UNIT Cap Take  by mouth.       No current facility-administered medications for this visit.      She  has a past medical history of Brain bleed (HCC) (2008) and Seizure (HCC).    I personally reviewed patient's problem list, allergies, medications, family hx, social hx with patient and update EPIC.     REVIEW OF SYSTEMS:  CONSTITUTIONAL:  Denies night sweats, fatigue, malaise, lethargy, fever or chills.  RESPIRATORY:  Denies cough, wheeze, hemoptysis, or shortness of breath.  CARDIOVASCULAR:  Denies chest pains, palpitations, pedal edema     Objective:     /74 (BP Location: Right arm, Patient Position: Sitting, BP Cuff Size: Adult)   Pulse 60   Temp 36.7 °C (98 °F)   Ht 1.626 m (5' 4\")   Wt 59.9 kg (132 lb)   SpO2 92%  Body mass index is 22.66 kg/m².    Physical Exam:  Constitutional: awake, alert, in no distress.  Skin: Warm, dry, good turgor, no rashes, bruises, ulcers in visible areas.  Eye: conjunctiva clear, lids neg for edema or lesions.  ENMT: TM and auditory canals wnl. Oral and nasal mucosa wnl. Lips without lesions, good dentition, oropharynx clear.  Neck: Trachea midline, no masses, no thyromegaly. No cervical or supraclavicular lymphadenopathy  Respiratory: Unlabored respiratory effort, lungs clear to auscultation, no wheezes, no rales.  Cardiovascular: Normal S1, S2, no murmur, no pedal edema.  Psych: Oriented x3, affect and mood wnl, intact judgement and insight.       Assessment and Plan:   The following treatment plan was discussed    1. Altered mental status, unspecified " altered mental status type  2. Acute cystitis without hematuria  Pt was recently seen in ER for altered mental status. I reviewed ED chart. She was diagnosed with UTI. She was treated with Ceftriaxone and Fosfomycin. She was discharged on the same day. Her  states that her symptoms are much improved. Will cont to monitor. Recommended adequate hydration.      3. Vascular dementia without behavioral disturbance (HCC)  Chronic vascular dementia secondary to previous strokes. Pt does not have behavioral disturbance though her  states that her mood has been unusual over the past few months. However, for the most part, pt is doing well. Will continue to monitor.     4. Age-related osteoporosis without current pathological fracture  - denosumab (PROLIA) 60 MG/ML Solution Prefilled Syringe; Inject 1 mL as instructed every 6 months for 2 doses.  Dispense: 2 mL; Refill: 0  - Calcium and Vitamin D  - Weight-bearing aerobic and strengthening exercises can decrease risk of falls and fractures by improving muscle strength, coordination, balance, and mobility  - Limit caffeine intake to 2 or fewer servings per day  - Limit alcohol consumption to one drink per day  - pt was counseled on fall risk prevention       Lisa Pratt M.D.    Followup: Return in about 6 months (around 5/7/2020).    Please note that this dictation was created using voice recognition software and/or scribes. I have made every reasonable attempt to correct obvious errors, but I expect that there are errors of grammar and possibly content that I did not discover before finalizing the note.     Yg SOSA (Ozibhemalatha), am scribing for, and in the presence of, Lisa Pratt M.D.    Electronically signed by: Yg Henriquez (Jef), 11/7/2019    Lisa SOSA M.D. personally performed the services described in this documentation, as scribed by Yg Henriquez in my presence, and it is both accurate and complete.

## 2019-11-10 LAB
BACTERIA BLD CULT: NORMAL
BACTERIA BLD CULT: NORMAL
SIGNIFICANT IND 70042: NORMAL
SIGNIFICANT IND 70042: NORMAL
SITE SITE: NORMAL
SITE SITE: NORMAL
SOURCE SOURCE: NORMAL
SOURCE SOURCE: NORMAL

## 2019-11-10 NOTE — ED NOTES
"ED Positive Culture Follow-up/Notification Note:    Date: 11/10/19     Patient seen in the ED on 11/5/2019 for AMS and foul-smelling urine.   1. Acute cystitis without hematuria    2. Cognitive and behavioral changes    3. Dementia with behavioral disturbance, unspecified dementia type (HCC)    4. History of seizures    5. History of intracranial hemorrhage    6. Dehydration       Discharge Medication List as of 11/5/2019  8:08 AM        Pt was given CTX x 1 and fosfomycin x 1 in the ED.    Allergies: Patient has no known allergies.     Vitals:    11/05/19 0402 11/05/19 0605 11/05/19 0700 11/05/19 0815   BP: 130/55 105/64  129/64   Pulse: 76 65 66 75   Resp:    16   Temp: 37.5 °C (99.5 °F)      TempSrc: Temporal      SpO2: 94% 90% 94% 96%       Final cultures:   Results     Procedure Component Value Units Date/Time    BLOOD CULTURE [[id number]] Collected:  11/05/19 0400    Order Status:  Completed Specimen:  Blood from Peripheral Updated:  11/10/19 0701     Significant Indicator NEG     Source BLD     Site PERIPHERAL     Culture Result No growth after 5 days of incubation.    Narrative:       Per Hospital Policy: Only change Specimen Src: to \"Line\" if  specified by physician order.  Right Hand    BLOOD CULTURE [[id number]] Collected:  11/05/19 0412    Order Status:  Completed Specimen:  Blood from Peripheral Updated:  11/10/19 0701     Significant Indicator NEG     Source BLD     Site PERIPHERAL     Culture Result No growth after 5 days of incubation.    Narrative:       Per Hospital Policy: Only change Specimen Src: to \"Line\" if  specified by physician order.  Right Forearm/Arm    URINE CULTURE [[id number]]  (Abnormal)  (Susceptibility) Collected:  11/05/19 0623    Order Status:  Completed Specimen:  Urine Updated:  11/07/19 1310     Significant Indicator POS     Source UR     Site -     Culture Result -      Escherichia coli  ,000 cfu/mL      Narrative:       Indication for culture:->Patient WITHOUT an " indwelling Magallanes  catheter in place with new onset of Dysuria, Frequency,  Urgency, and/or Suprapubic pain  Indication for culture:->Patient WITHOUT an indwelling Magallanes    Susceptibility     Escherichia coli (1)     Antibiotic Interpretation Microscan Method Status    Ampicillin Resistant >16 mcg/mL ANALILIA Final    Ceftriaxone Sensitive <=1 mcg/mL ANALILIA Final    Ceftazidime Sensitive <=1 mcg/mL ANALILIA Final    Cefotaxime Sensitive <=2 mcg/mL ANALILIA Final    Cefazolin Sensitive 4 mcg/mL ANALILIA Final    Ciprofloxacin Sensitive <=1 mcg/mL ANALILIA Final    Ampicillin/sulbactam Resistant >16/8 mcg/mL ANALILIA Final    Cefepime Sensitive <=2 mcg/mL ANALILIA Final    Tobramycin Sensitive <=4 mcg/mL ANALILIA Final    Cefotetan Sensitive <=16 mcg/mL ANALILIA Final    Nitrofurantoin Sensitive <=32 mcg/mL ANALILIA Final    Gentamicin Sensitive <=4 mcg/mL ANALILIA Final    Levofloxacin Sensitive <=2 mcg/mL ANALILIA Final    Pip/Tazobactam Sensitive <=16 mcg/mL ANALILIA Final    Trimeth/Sulfa Sensitive <=2/38 mcg/mL ANALILIA Final                   URINALYSIS (UA) [[id number]]  (Abnormal) Collected:  11/05/19 0623    Order Status:  Completed Specimen:  Urine Updated:  11/05/19 0651     Color Yellow     Character Clear     Specific Gravity 1.018     Ph 5.5     Glucose Negative mg/dL      Ketones Negative mg/dL      Protein Negative mg/dL      Bilirubin Negative     Urobilinogen, Urine 0.2     Nitrite Positive     Leukocyte Esterase Trace     Occult Blood Small     Micro Urine Req Microscopic    Narrative:       Indication for culture:->Patient WITHOUT an indwelling Magallanes  catheter in place with new onset of Dysuria, Frequency,  Urgency, and/or Suprapubic pain          Plan:   Appropriate antibiotic therapy provided in the ED. No changes required based upon culture result.    Sent letter to patient to notify of positive culture result and that she has been treated in the ED.     Vanessa Maya, EddieD

## 2020-01-01 ENCOUNTER — OFFICE VISIT (OUTPATIENT)
Dept: MEDICAL GROUP | Age: 85
End: 2020-01-01
Payer: MEDICARE

## 2020-01-01 VITALS
WEIGHT: 123.6 LBS | HEART RATE: 66 BPM | OXYGEN SATURATION: 96 % | DIASTOLIC BLOOD PRESSURE: 72 MMHG | SYSTOLIC BLOOD PRESSURE: 135 MMHG | HEIGHT: 64 IN | BODY MASS INDEX: 21.1 KG/M2 | TEMPERATURE: 97.7 F

## 2020-01-01 DIAGNOSIS — L52 ERYTHEMA NODOSUM: Primary | ICD-10-CM

## 2020-01-01 DIAGNOSIS — Z48.02 VISIT FOR SUTURE REMOVAL: ICD-10-CM

## 2020-01-01 DIAGNOSIS — R56.9 SEIZURE (HCC): ICD-10-CM

## 2020-01-01 PROCEDURE — 99214 OFFICE O/P EST MOD 30 MIN: CPT | Performed by: FAMILY MEDICINE

## 2020-01-01 ASSESSMENT — FIBROSIS 4 INDEX: FIB4 SCORE: 1.94

## 2020-01-02 DIAGNOSIS — R56.9 SEIZURE (HCC): ICD-10-CM

## 2020-01-02 RX ORDER — LACOSAMIDE 50 MG/1
50 TABLET ORAL 2 TIMES DAILY
Qty: 180 TAB | Refills: 3 | Status: SHIPPED | OUTPATIENT
Start: 2020-01-02 | End: 2020-06-22 | Stop reason: SDUPTHER

## 2020-02-12 ENCOUNTER — TELEPHONE (OUTPATIENT)
Dept: NEUROLOGY | Facility: MEDICAL CENTER | Age: 85
End: 2020-02-12

## 2020-02-13 NOTE — TELEPHONE ENCOUNTER
In regards to the patient's 's request for a letter of competency, or lack thereof, since I have not seen her in almost 6 months, I would like to be able to before I officially declare her as incompetent.  But, looking at my previous notes, she probably is already there.  I know that I am booking out months even for follow-up visits, and I do not know if this issue legally needs to be taken care of sooner rather than later.  Find out what their time schedule is before I decide what to do.

## 2020-02-18 ENCOUNTER — TELEPHONE (OUTPATIENT)
Dept: NEUROLOGY | Facility: MEDICAL CENTER | Age: 85
End: 2020-02-18

## 2020-02-18 NOTE — LETTER
2020        Keiko Camacho  : 1934    To whom it may concern:    Ms. 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  Her condition is now permanent.    Sincerely:          Elias Roper M.D.

## 2020-02-18 NOTE — TELEPHONE ENCOUNTER
----- Message from Ruchi Camacho sent at 2/11/2020 12:36 PM PST -----  Regarding: Non-Urgent Medical Question  Contact: [phone]  Dr. Roper  We previously discussed having my wife Danielle declared incompetant. I need to get this done now and the  I have chosen, Mr. Sana Roberts at [phone] tells me that he requires a letter from two doctors to proceed. At our last appointment you stated you would provide this.  I will also contact our Family Practice Doctor Lisa Pratt for the 2nd letter. Your attention to this matter will be appreciated.  Thank you  Raul Camacho   Spouse

## 2020-02-20 NOTE — TELEPHONE ENCOUNTER
Printed letter and put on your desk to sign, patient stated that he will come in and  on 2/21/2020.

## 2020-02-21 ENCOUNTER — PATIENT MESSAGE (OUTPATIENT)
Dept: MEDICAL GROUP | Age: 85
End: 2020-02-21

## 2020-03-12 ENCOUNTER — HOSPITAL ENCOUNTER (OUTPATIENT)
Dept: LAB | Facility: MEDICAL CENTER | Age: 85
End: 2020-03-12
Attending: FAMILY MEDICINE
Payer: MEDICARE

## 2020-03-12 DIAGNOSIS — E78.00 PURE HYPERCHOLESTEROLEMIA: ICD-10-CM

## 2020-03-12 LAB
ALBUMIN SERPL BCP-MCNC: 4.1 G/DL (ref 3.2–4.9)
ALBUMIN/GLOB SERPL: 1.2 G/DL
ALP SERPL-CCNC: 53 U/L (ref 30–99)
ALT SERPL-CCNC: 7 U/L (ref 2–50)
ANION GAP SERPL CALC-SCNC: 9 MMOL/L (ref 7–16)
AST SERPL-CCNC: 13 U/L (ref 12–45)
BASOPHILS # BLD AUTO: 1 % (ref 0–1.8)
BASOPHILS # BLD: 0.07 K/UL (ref 0–0.12)
BILIRUB SERPL-MCNC: 0.6 MG/DL (ref 0.1–1.5)
BUN SERPL-MCNC: 27 MG/DL (ref 8–22)
CALCIUM SERPL-MCNC: 10.2 MG/DL (ref 8.5–10.5)
CHLORIDE SERPL-SCNC: 107 MMOL/L (ref 96–112)
CHOLEST SERPL-MCNC: 120 MG/DL (ref 100–199)
CO2 SERPL-SCNC: 25 MMOL/L (ref 20–33)
CREAT SERPL-MCNC: 0.82 MG/DL (ref 0.5–1.4)
EOSINOPHIL # BLD AUTO: 0.15 K/UL (ref 0–0.51)
EOSINOPHIL NFR BLD: 2.1 % (ref 0–6.9)
ERYTHROCYTE [DISTWIDTH] IN BLOOD BY AUTOMATED COUNT: 46.2 FL (ref 35.9–50)
GLOBULIN SER CALC-MCNC: 3.3 G/DL (ref 1.9–3.5)
GLUCOSE SERPL-MCNC: 97 MG/DL (ref 65–99)
HCT VFR BLD AUTO: 42.5 % (ref 37–47)
HDLC SERPL-MCNC: 36 MG/DL
HGB BLD-MCNC: 13.4 G/DL (ref 12–16)
IMM GRANULOCYTES # BLD AUTO: 0.02 K/UL (ref 0–0.11)
IMM GRANULOCYTES NFR BLD AUTO: 0.3 % (ref 0–0.9)
LDLC SERPL CALC-MCNC: 70 MG/DL
LYMPHOCYTES # BLD AUTO: 1.92 K/UL (ref 1–4.8)
LYMPHOCYTES NFR BLD: 26.8 % (ref 22–41)
MCH RBC QN AUTO: 30.3 PG (ref 27–33)
MCHC RBC AUTO-ENTMCNC: 31.5 G/DL (ref 33.6–35)
MCV RBC AUTO: 96.2 FL (ref 81.4–97.8)
MONOCYTES # BLD AUTO: 0.55 K/UL (ref 0–0.85)
MONOCYTES NFR BLD AUTO: 7.7 % (ref 0–13.4)
NEUTROPHILS # BLD AUTO: 4.45 K/UL (ref 2–7.15)
NEUTROPHILS NFR BLD: 62.1 % (ref 44–72)
NRBC # BLD AUTO: 0 K/UL
NRBC BLD-RTO: 0 /100 WBC
PLATELET # BLD AUTO: 198 K/UL (ref 164–446)
PMV BLD AUTO: 10.9 FL (ref 9–12.9)
POTASSIUM SERPL-SCNC: 4.2 MMOL/L (ref 3.6–5.5)
PROT SERPL-MCNC: 7.4 G/DL (ref 6–8.2)
RBC # BLD AUTO: 4.42 M/UL (ref 4.2–5.4)
SODIUM SERPL-SCNC: 141 MMOL/L (ref 135–145)
TRIGL SERPL-MCNC: 71 MG/DL (ref 0–149)
WBC # BLD AUTO: 7.2 K/UL (ref 4.8–10.8)

## 2020-03-12 PROCEDURE — 85025 COMPLETE CBC W/AUTO DIFF WBC: CPT

## 2020-03-12 PROCEDURE — 36415 COLL VENOUS BLD VENIPUNCTURE: CPT

## 2020-03-12 PROCEDURE — 80053 COMPREHEN METABOLIC PANEL: CPT

## 2020-03-12 PROCEDURE — 80061 LIPID PANEL: CPT

## 2020-03-17 ENCOUNTER — OFFICE VISIT (OUTPATIENT)
Dept: NEUROLOGY | Facility: MEDICAL CENTER | Age: 85
End: 2020-03-17
Payer: MEDICARE

## 2020-03-17 VITALS
DIASTOLIC BLOOD PRESSURE: 64 MMHG | OXYGEN SATURATION: 97 % | TEMPERATURE: 97.7 F | HEART RATE: 64 BPM | SYSTOLIC BLOOD PRESSURE: 112 MMHG | RESPIRATION RATE: 15 BRPM | WEIGHT: 131.84 LBS | HEIGHT: 63 IN | BODY MASS INDEX: 23.36 KG/M2

## 2020-03-17 DIAGNOSIS — R56.9 SEIZURE (HCC): Primary | ICD-10-CM

## 2020-03-17 DIAGNOSIS — F01.50 VASCULAR DEMENTIA WITHOUT BEHAVIORAL DISTURBANCE (HCC): ICD-10-CM

## 2020-03-17 PROCEDURE — 99213 OFFICE O/P EST LOW 20 MIN: CPT | Performed by: PSYCHIATRY & NEUROLOGY

## 2020-03-17 ASSESSMENT — PATIENT HEALTH QUESTIONNAIRE - PHQ9: CLINICAL INTERPRETATION OF PHQ2 SCORE: 0

## 2020-03-17 ASSESSMENT — FIBROSIS 4 INDEX: FIB4 SCORE: 2.11

## 2020-03-17 NOTE — PROGRESS NOTES
"Subjective:      Keiko Camacho is a 85 y.o. 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  Nj gives all of the history.    HPI    Seizures: Stable, according to Nj, she has had no seizure recurrences.  She is tolerating Vimpat 50 mg, twice daily, without issue.  Though he puts medication out for her, she does take it willingly.  She is never complained of issues of dizziness, he has never seen her change in personality, etc.    Dementia: Things remain about the same.  She is still forgetful, require some assistance with some daily activities, though she is for the most part independent.  Medications are provided, Nj does all the cooking.  She sleeps well, in fact she sleeps a little bit too much.    Keiko again feels that she is doing well.  She denies she is having any problems.    She did suffer from a UTI recently, this causes significant disruption in her behavior and personality, she became quite angry and irascible!  With the treatment of the infection, things returned to baseline.    Medical, surgical and family histories are reviewed, there are no new drug allergies.  She is on Vimpat 50 mg, twice daily, Cozaar, Tenormin, Crestor, and vitamin D with calcium.    Review of Systems   Unable to perform ROS: Dementia        Objective:     /64 (BP Location: Left arm)   Pulse 64   Temp 36.5 °C (97.7 °F) (Temporal)   Resp 15   Ht 1.6 m (5' 3\")   Wt 59.8 kg (131 lb 13.4 oz)   SpO2 97%   BMI 23.35 kg/m²      Physical Exam    She appears in no acute distress.  She is clean and appropriately dressed, she is cooperative, but her ability to cooperate is still severely curtailed.  Vital signs are stable.  There is no malar rash.  The neck is supple.  Cardiac evaluation reveals a regular rhythm.    When asked about the date, she simply repeats the fact that \"I am a good girl\".  She does know she is in " the city, when given the name, she knows the state is [place].  She has difficulty naming, some paraphasic errors are used, but for the most part she does so correctly.  Her mental processing speed is very slow.  She is quite perseverative.  When asked to demonstrate the use of a toothbrush or comb, she cannot perform this.  Even when handed a pen, she has some difficulty.  She does repeat verbal commands.    Otherwise, radial nerve, musculoskeletal and coordination evaluations are unchanged.  There is some mild postural instability, no loss of coordination of the extremities.  Visual fields are full, PERRLA/EOMI, there is no bulbar dysfunction, facial movements are symmetric.     Assessment/Plan:     1. complex partial seizure  Stable, Vimpat 50 mg, twice daily will be continued.    2. Vascular dementia without behavioral disturbance (HCC)  She is holding on, at a steady pace, her dementia is quite significant.  No specific medications are indicated.  We did get her off baby aspirin without issue in the past.  Work-up did not demonstrate any findings consistent with amyloid.  We will simply follow-up in 1 year.    Time: 20 minutes spent face-to-face for exam, review, discussion, and education, of this over 50% of the time spent counseling and coordinating care with Rashad

## 2020-03-19 ENCOUNTER — OFFICE VISIT (OUTPATIENT)
Dept: MEDICAL GROUP | Age: 85
End: 2020-03-19
Payer: MEDICARE

## 2020-03-19 VITALS
TEMPERATURE: 98.5 F | HEIGHT: 64 IN | DIASTOLIC BLOOD PRESSURE: 60 MMHG | BODY MASS INDEX: 22.88 KG/M2 | OXYGEN SATURATION: 95 % | SYSTOLIC BLOOD PRESSURE: 116 MMHG | WEIGHT: 134 LBS | HEART RATE: 58 BPM

## 2020-03-19 DIAGNOSIS — I69.30 HISTORY OF HEMORRHAGIC CEREBROVASCULAR ACCIDENT (CVA) WITH RESIDUAL DEFICIT: ICD-10-CM

## 2020-03-19 DIAGNOSIS — E78.2 MIXED HYPERLIPIDEMIA: ICD-10-CM

## 2020-03-19 DIAGNOSIS — Z23 NEED FOR VACCINATION: ICD-10-CM

## 2020-03-19 DIAGNOSIS — M81.0 AGE-RELATED OSTEOPOROSIS WITHOUT CURRENT PATHOLOGICAL FRACTURE: ICD-10-CM

## 2020-03-19 DIAGNOSIS — Z00.00 MEDICARE ANNUAL WELLNESS VISIT, SUBSEQUENT: ICD-10-CM

## 2020-03-19 DIAGNOSIS — R56.9 SEIZURE (HCC): ICD-10-CM

## 2020-03-19 DIAGNOSIS — F01.50 VASCULAR DEMENTIA WITHOUT BEHAVIORAL DISTURBANCE (HCC): ICD-10-CM

## 2020-03-19 DIAGNOSIS — Z86.79 HISTORY OF SUBARACHNOID HEMORRHAGE: ICD-10-CM

## 2020-03-19 DIAGNOSIS — I10 ESSENTIAL HYPERTENSION: ICD-10-CM

## 2020-03-19 PROBLEM — R73.01 IMPAIRED FASTING BLOOD SUGAR: Status: RESOLVED | Noted: 2019-09-19 | Resolved: 2020-03-19

## 2020-03-19 PROCEDURE — 90715 TDAP VACCINE 7 YRS/> IM: CPT | Performed by: FAMILY MEDICINE

## 2020-03-19 PROCEDURE — G0439 PPPS, SUBSEQ VISIT: HCPCS | Performed by: FAMILY MEDICINE

## 2020-03-19 PROCEDURE — 90471 IMMUNIZATION ADMIN: CPT | Performed by: FAMILY MEDICINE

## 2020-03-19 ASSESSMENT — ACTIVITIES OF DAILY LIVING (ADL): BATHING_REQUIRES_ASSISTANCE: 1

## 2020-03-19 ASSESSMENT — FIBROSIS 4 INDEX: FIB4 SCORE: 2.11

## 2020-03-19 ASSESSMENT — PATIENT HEALTH QUESTIONNAIRE - PHQ9: CLINICAL INTERPRETATION OF PHQ2 SCORE: 0

## 2020-03-19 ASSESSMENT — ENCOUNTER SYMPTOMS: GENERAL WELL-BEING: GOOD

## 2020-03-19 NOTE — PROGRESS NOTES
Chief Complaint   Patient presents with   • Annual Wellness Visit            HPI:  Ruchi Camacho is a 85 y.o. here for Medicare Annual Wellness Visit     Chronic History -- The patient has chronic history of vascular dementia following a previous stroke. She is followed by Dr. Roper (Neurology), who she has an appointment with next week.  denies any new changes with this.     Chronic History -- Patient has a history of chronic hypertension and is taking losartan 100 mg QD, atenolol 25 mg, 1/2 tab daily. No medication side effects were reported. She reportedly monitors blood pressure regularly at home. Blood pressure is 116/60 today.  is interested in stopping atenolol today. She denies any related complaints including chronic cough, chest pain, headaches, leg swelling, light-headedness or dizziness.      Chronic History -- The patient has chronic history of complex partial seizures. Well controlled with Vimpat 50 mg BID.  reports this works well for her and denies side effects. She is followed by Dr. Roper (Neurology).     Chronic History -- The patient has a chronic history of mixed hyperlipidemia. She has history of CVA x2/subarachnoid hemorrhage with residual deficit. Currently taking rosuvastatin 20 mg QHS. No medication side effects were reported including myalgias or abdominal pain. No acute complaints of dizziness, claudication or chest pain. I reviewed recent blood work with the patient in clinic today.      Ref. Range 3/12/2020 10:20   Cholesterol,Tot Latest Ref Range: 100 - 199 mg/dL 120   Triglycerides Latest Ref Range: 0 - 149 mg/dL 71   HDL Latest Ref Range: >=40 mg/dL 36 (A)   LDL Latest Ref Range: <100 mg/dL 70      Chronic History -- The patient has chronic history of age-related osteoporosis with current pathological fracture. DEXA scan performed in 9/18/18 ws indicative of osteoporosis. She is now on Prolia every 6 months, which she was started on on 11/7/19. She  took Fosamax in the past, but could not tolerate side effects. There were questionable concerns by her  about Fosamax role in her past cerebral hemorrhage. She has history of falls in the past without sustained injury.        notes the patient's sister recently passed away from uterine cancer.     Patient Active Problem List    Diagnosis Date Noted   • Vascular dementia without behavioral disturbance (HCC) 11/07/2019   • History of subarachnoid hemorrhage 03/18/2019   • Essential hypertension 12/20/2018   • complex partial seizure 12/20/2018   • History of hemorrhagic cerebrovascular accident (CVA) with residual deficit x2 12/20/2018   • Osteoporosis_declined treatment 09/26/2017   • Mixed hyperlipidemia 03/27/2013       Current Outpatient Medications   Medication Sig Dispense Refill   • lacosamide (VIMPAT) 50 MG Tab tablet Take 1 Tab by mouth 2 Times a Day for 180 days. 180 Tab 3   • losartan (COZAAR) 100 MG Tab Take 1 Tab by mouth every day. 90 Tab 3   • rosuvastatin (CRESTOR) 20 MG Tab Take 1 Tab by mouth every evening. 90 Tab 3   • calcium carbonate (TUMS) 500 MG Chew Tab Take 500 mg by mouth every day. BID     • Cholecalciferol (VITAMIN D3) 2000 UNIT Cap Take 3,000 Units by mouth.       No current facility-administered medications for this visit.             Current supplements as per medication list.       Allergies: Patient has no known allergies.    Current social contact/activities: goes out to movies with  and visits with daughter sometimes goes to [employer].      She  reports that she has never smoked. She has never used smokeless tobacco. She reports that she does not drink alcohol or use drugs.  Counseling given: Not Answered      DPA/Advanced Directive:  Patient has Advanced Directive on file.     ROS:    Gait: Uses no assistive device  Ostomy: No  Other tubes: No  Amputations: No  Chronic oxygen use: No  Last eye exam: 12/2019  Wears hearing aids: No   : Reports urinary leakage  during the last 6 months that has not interfered at all with their daily activities or sleep.    Screening:  Depression Screening    Little interest or pleasure in doing things?  0 - not at all  Feeling down, depressed , or hopeless? 0 - not at all  Patient Health Questionnaire Score: 0     Screening for Cognitive Impairment    Three Minute Recall (village, kitchen, baby) 0/3 Due to dementia  Neal clock face with all 12 numbers and set the hands to show 10 past 10.  No Unable to Due to dementia    Fall Risk Assessment    Has the patient had two or more falls in the last year or any fall with injury in the last year?  No    Safety Assessment    Throw rugs on floor.  No  Handrails on all stairs.  Yes  Good lighting in all hallways.  Yes  Difficulty hearing.  No  Patient counseled about all safety risks that were identified.    Functional Assessment ADLs    Are there any barriers preventing you from cooking for yourself or meeting nutritional needs?  No. Due to dementia  Are there any barriers preventing you from driving safely or obtaining transportation?  No. Due to dementia  Are there any barriers preventing you from using a telephone or calling for help?  No.    Are there any barriers preventing you from shopping?  No. Due to dementia  Are there any barriers preventing you from taking care of your own finances?  No. Due to dementia  Are there any barriers preventing you from managing your medications?  No. Due to dementia  Are there any barriers preventing you from showering, bathing or dressing yourself?  Yes.    Are you currently engaging in any exercise or physical activity?  No.  House and yard work  What is your perception of your health?  Good.      Health Maintenance Summary                IMM ZOSTER VACCINES Postponed 4/7/2020 Originally 5/14/2014. System: vaccine not available, other system reasons     Done 3/19/2014 Imm Admin: Zoster Vaccine Live (ZVL) (Zostavax)    Annual Wellness Visit Next Due  "3/20/2021      Done 3/19/2020 SUBSEQUENT ANNUAL WELLNESS VISIT-INCLUDES PPPS ()     Patient has more history with this topic...    BONE DENSITY Next Due 9/18/2023      Done 9/18/2018 DS-BONE DENSITY STUDY (DEXA)    IMM DTaP/Tdap/Td Vaccine Next Due 3/19/2030      Done 3/19/2020 Imm Admin: Tdap Vaccine     Patient has more history with this topic...          Patient Care Team:  Lisa Pratt M.D. as PCP - General (Family Medicine)  Elias Roper M.D. (Neurology)        Social History     Tobacco Use   • Smoking status: Never Smoker   • Smokeless tobacco: Never Used   Substance Use Topics   • Alcohol use: No   • Drug use: No     Family History   Problem Relation Age of Onset   • Heart Failure Mother    • Stroke Father    • Heart Failure Father    • Cancer Sister      She  has a past medical history of Brain bleed (HCC) (2008) and Seizure (HCC).   History reviewed. No pertinent surgical history.    Exam:   /60 (BP Location: Left arm, Patient Position: Sitting, BP Cuff Size: Adult)   Pulse (!) 58   Temp 36.9 °C (98.5 °F)   Ht 1.626 m (5' 4\")   Wt 60.8 kg (134 lb)   SpO2 95%  Body mass index is 23 kg/m².    Hearing excellent.    Dentition good  Alert, oriented in no acute distress.  Eye contact is good, speech goal directed, affect calm    Assessment and Plan.     1. Essential hypertension  Chronic, controlled with Losartan 100 mg and Atenolol 12.5 mg qd, no s/e reported. Her BP is 116/60 today. Her  wants to have Atenolol removed as he does not think that she needs this medication.   - discontinued Atenolol  - cont Losartan 100 mg qd  - Pt was counseled on lifestyle modification         2. complex partial seizure  Chronic, controlled with Vimpat 50 mg qd, no s/e reported, working with Dr. Roper.   - cont Vimpat 50 mg qd  - f/u with Dr. Roper       3. 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 History of subarachnoid hemorrhage  5. Vascular dementia " without behavioral disturbance (HCC)  Hx of hemorrhagic accidents x2, she has vascular dementia that is stable. She does not have behavioral problems. Her  and daughter are primary caregiver. She appears happy and content throughout visit today.   - cont Crestor 20 mg qd     6. Age-related osteoporosis without current pathological fracture  - cont Prolia every 6 months.   - Calcium and Vitamin D  - Weight-bearing aerobic and strengthening exercises can decrease risk of falls and fractures by improving muscle strength, coordination, balance, and mobility  - Limit caffeine intake to 2 or fewer servings per day  - Limit alcohol consumption to one drink per day  - pt was counseled on fall risk prevention       7. Mixed hyperlipidemia  - cont Crestor 20 mg qd.    8. Need for vaccination  - TDAP VACCINE =>6YO IM    9. Medicare annual wellness visit, subsequent  - Subsequent Annual Wellness Visit - Includes PPPS ()     Services suggested: No services needed at this time  Health Care Screening: Age-appropriate preventive services recommended by USPTF and ACIP covered by Medicare were discussed today. Services ordered if indicated and agreed upon by the patient.  Referrals offered: Community-based lifestyle interventions to reduce health risks and promote self-management and wellness, fall prevention, nutrition, physical activity, tobacco-use cessation, weight loss, and mental health services as per orders if indicated.    Discussion today about general wellness and lifestyle habits:    · Prevent falls and reduce trip hazards; Cautioned about securing or removing rugs.  · Have a working fire alarm and carbon monoxide detector;   · Engage in regular physical activity and social activities     Follow-up: Return in about 6 months (around 9/19/2020) for Multiple issues.

## 2020-03-25 ENCOUNTER — HOSPITAL ENCOUNTER (EMERGENCY)
Facility: MEDICAL CENTER | Age: 85
End: 2020-03-25
Attending: EMERGENCY MEDICINE
Payer: MEDICARE

## 2020-03-25 ENCOUNTER — APPOINTMENT (OUTPATIENT)
Dept: RADIOLOGY | Facility: MEDICAL CENTER | Age: 85
End: 2020-03-25
Attending: EMERGENCY MEDICINE
Payer: MEDICARE

## 2020-03-25 VITALS
HEART RATE: 53 BPM | RESPIRATION RATE: 15 BRPM | WEIGHT: 134 LBS | DIASTOLIC BLOOD PRESSURE: 59 MMHG | BODY MASS INDEX: 22.88 KG/M2 | TEMPERATURE: 97.9 F | HEIGHT: 64 IN | OXYGEN SATURATION: 97 % | SYSTOLIC BLOOD PRESSURE: 129 MMHG

## 2020-03-25 DIAGNOSIS — G40.909 SEIZURE DISORDER (HCC): ICD-10-CM

## 2020-03-25 LAB
ALBUMIN SERPL BCP-MCNC: 3.8 G/DL (ref 3.2–4.9)
ALBUMIN/GLOB SERPL: 1.2 G/DL
ALP SERPL-CCNC: 61 U/L (ref 30–99)
ALT SERPL-CCNC: <5 U/L (ref 2–50)
ANION GAP SERPL CALC-SCNC: 9 MMOL/L (ref 7–16)
AST SERPL-CCNC: 9 U/L (ref 12–45)
BASOPHILS # BLD AUTO: 0.9 % (ref 0–1.8)
BASOPHILS # BLD: 0.08 K/UL (ref 0–0.12)
BILIRUB SERPL-MCNC: 0.4 MG/DL (ref 0.1–1.5)
BUN SERPL-MCNC: 21 MG/DL (ref 8–22)
CALCIUM SERPL-MCNC: 9.2 MG/DL (ref 8.5–10.5)
CHLORIDE SERPL-SCNC: 106 MMOL/L (ref 96–112)
CO2 SERPL-SCNC: 25 MMOL/L (ref 20–33)
CREAT SERPL-MCNC: 0.73 MG/DL (ref 0.5–1.4)
EOSINOPHIL # BLD AUTO: 0.15 K/UL (ref 0–0.51)
EOSINOPHIL NFR BLD: 1.7 % (ref 0–6.9)
ERYTHROCYTE [DISTWIDTH] IN BLOOD BY AUTOMATED COUNT: 45.7 FL (ref 35.9–50)
GLOBULIN SER CALC-MCNC: 3.1 G/DL (ref 1.9–3.5)
GLUCOSE SERPL-MCNC: 135 MG/DL (ref 65–99)
HCT VFR BLD AUTO: 40.3 % (ref 37–47)
HGB BLD-MCNC: 12.9 G/DL (ref 12–16)
LYMPHOCYTES # BLD AUTO: 1.2 K/UL (ref 1–4.8)
LYMPHOCYTES NFR BLD: 13.2 % (ref 22–41)
MANUAL DIFF BLD: NORMAL
MCH RBC QN AUTO: 30.7 PG (ref 27–33)
MCHC RBC AUTO-ENTMCNC: 32 G/DL (ref 33.6–35)
MCV RBC AUTO: 96 FL (ref 81.4–97.8)
MONOCYTES # BLD AUTO: 0.48 K/UL (ref 0–0.85)
MONOCYTES NFR BLD AUTO: 5.3 % (ref 0–13.4)
MORPHOLOGY BLD-IMP: NORMAL
NEUTROPHILS # BLD AUTO: 7.18 K/UL (ref 2–7.15)
NEUTROPHILS NFR BLD: 78.9 % (ref 44–72)
NRBC # BLD AUTO: 0 K/UL
NRBC BLD-RTO: 0 /100 WBC
OVALOCYTES BLD QL SMEAR: NORMAL
PLATELET # BLD AUTO: 191 K/UL (ref 164–446)
PLATELET BLD QL SMEAR: NORMAL
PMV BLD AUTO: 10.3 FL (ref 9–12.9)
POIKILOCYTOSIS BLD QL SMEAR: NORMAL
POTASSIUM SERPL-SCNC: 4.7 MMOL/L (ref 3.6–5.5)
PROT SERPL-MCNC: 6.9 G/DL (ref 6–8.2)
RBC # BLD AUTO: 4.2 M/UL (ref 4.2–5.4)
RBC BLD AUTO: PRESENT
SODIUM SERPL-SCNC: 140 MMOL/L (ref 135–145)
WBC # BLD AUTO: 9.1 K/UL (ref 4.8–10.8)

## 2020-03-25 PROCEDURE — 85007 BL SMEAR W/DIFF WBC COUNT: CPT

## 2020-03-25 PROCEDURE — 80053 COMPREHEN METABOLIC PANEL: CPT

## 2020-03-25 PROCEDURE — 99284 EMERGENCY DEPT VISIT MOD MDM: CPT

## 2020-03-25 PROCEDURE — 85027 COMPLETE CBC AUTOMATED: CPT

## 2020-03-25 PROCEDURE — 70450 CT HEAD/BRAIN W/O DYE: CPT

## 2020-03-25 ASSESSMENT — LIFESTYLE VARIABLES
EVER FELT BAD OR GUILTY ABOUT YOUR DRINKING: NO
HAVE YOU EVER FELT YOU SHOULD CUT DOWN ON YOUR DRINKING: NO
TOTAL SCORE: 0
CONSUMPTION TOTAL: NEGATIVE
TOTAL SCORE: 0
AVERAGE NUMBER OF DAYS PER WEEK YOU HAVE A DRINK CONTAINING ALCOHOL: 0
TOTAL SCORE: 0
ON A TYPICAL DAY WHEN YOU DRINK ALCOHOL HOW MANY DRINKS DO YOU HAVE: 0
EVER HAD A DRINK FIRST THING IN THE MORNING TO STEADY YOUR NERVES TO GET RID OF A HANGOVER: NO
HAVE PEOPLE ANNOYED YOU BY CRITICIZING YOUR DRINKING: NO
HOW MANY TIMES IN THE PAST YEAR HAVE YOU HAD 5 OR MORE DRINKS IN A DAY: 0
DO YOU DRINK ALCOHOL: NO

## 2020-03-25 ASSESSMENT — FIBROSIS 4 INDEX: FIB4 SCORE: 2.11

## 2020-03-25 NOTE — ED PROVIDER NOTES
"ED Provider Note    Scribed for Gil Martinez M.D. by Duane Muro. 3/25/2020, 9:28 AM.    Primary care provider: Lisa Pratt M.D.  Means of arrival: EMS  History obtained from: Patient  History limited by: Patient's dementia    CHIEF COMPLAINT  Chief Complaint   Patient presents with   • Seizure       HPI  Ruchi Camacho is a 85 y.o. female who presents to the Emergency Department for suspected seizure and ALOC onset this morning.  states that he found the patient \"on the crapper\" earlier this morning in a seizure-like state. Patient has a history of dementia, several hemorrhagic, and seizures and the  believes that her episode this morning was a seizure. She has no recollection of the episode.    HPI limited secondary to patient's dementia.    REVIEW OF SYSTEMS  Review of Systems   Unable to perform ROS: Dementia       PAST MEDICAL HISTORY   has a past medical history of Brain bleed (HCC) (2008) and Seizure (HCC).    SURGICAL HISTORY  patient denies any surgical history    SOCIAL HISTORY  Social History     Tobacco Use   • Smoking status: Never Smoker   • Smokeless tobacco: Never Used   Substance Use Topics   • Alcohol use: No   • Drug use: No      Social History     Substance and Sexual Activity   Drug Use No       FAMILY HISTORY  Family History   Problem Relation Age of Onset   • Heart Failure Mother    • Stroke Father    • Heart Failure Father    • Cancer Sister        CURRENT MEDICATIONS  Current Outpatient Medications:   •  lacosamide (VIMPAT) 50 MG Tab tablet, Take 1 Tab by mouth 2 Times a Day for 180 days., Disp: 180 Tab, Rfl: 3  •  losartan (COZAAR) 100 MG Tab, Take 1 Tab by mouth every day., Disp: 90 Tab, Rfl: 3  •  rosuvastatin (CRESTOR) 20 MG Tab, Take 1 Tab by mouth every evening., Disp: 90 Tab, Rfl: 3  •  calcium carbonate (TUMS) 500 MG Chew Tab, Take 500 mg by mouth every day. BID, Disp: , Rfl:   •  Cholecalciferol (VITAMIN D3) 2000 UNIT Cap, Take 3,000 Units by mouth., Disp: " ", Rfl:     ALLERGIES  No Known Allergies    PHYSICAL EXAM  VITAL SIGNS: /60   Pulse (!) 56   Temp 36.6 °C (97.9 °F)   Resp 18   Ht 1.626 m (5' 4\")   Wt 60.8 kg (134 lb)   SpO2 96%   BMI 23.00 kg/m²     Constitutional:   No acute distress  HENT:  Moist mucous membranes  Eyes:  No conjunctivitis or icterus  Neck:  trachea is midline, no palpable thyroid  Lymphatic:  No cervical lymphadenopathy  Cardiovascular:  Regular rate and rhythm, no murmurs  Thorax & Lungs:  Normal breath sounds, no rhonchi  Abdomen:  Soft, Non-tender  Skin:.  no rash  Extremities:   no edema  Vascular:  symmetric radial pulse  Neurologic: Alert and oriented. Cranial nerves II-XII intact, EOMs intact, no tongue deviation, PERRL, no facial asymmetry to motor or sensation, symmetric palate, normal finger-to-nose test, no pronator drift. No focal motor deficits. Symmetric reflexes.       LABS  Labs Reviewed   CBC WITH DIFFERENTIAL - Abnormal; Notable for the following components:       Result Value    MCHC 32.0 (*)     Neutrophils-Polys 78.90 (*)     Lymphocytes 13.20 (*)     Neutrophils (Absolute) 7.18 (*)     All other components within normal limits   COMP METABOLIC PANEL - Abnormal; Notable for the following components:    Glucose 135 (*)     AST(SGOT) 9 (*)     All other components within normal limits   DIFFERENTIAL MANUAL   PERIPHERAL SMEAR REVIEW   PLATELET ESTIMATE   MORPHOLOGY   ESTIMATED GFR     All labs reviewed by me.      RADIOLOGY  CT-HEAD W/O   Final Result         1. No acute intracranial findings. No evidence of acute intracranial hemorrhage or mass lesion.      2. White matter lucencies most consistent with chronic small vessel ischemic change.      3. Unchanged small encephalomalacia in the left parietal temporal lobe.           The radiologist's interpretation of all radiological studies have been reviewed by me.    COURSE & MEDICAL DECISION MAKING  Pertinent Labs & Imaging studies reviewed. (See chart for " details)    9:28 AM - Patient seen and examined at bedside. Ordered CT-head w/o, CBC with diff, and CMP to evaluate her symptoms to rule out electrolyte abnormality vs brain bleed.    Medical Decision Making:   Patient CT does not show any acute abnormalities neuro exam is unchanged other labs are unremarkable.  I am discharging the patient to continue her medications return if worsening symptoms follow-up with her physician        FINAL IMPRESSION  1. Seizure disorder (HCC)          I, Duane Muro (Scribe), am scribing for, and in the presence of, Gil Martinez M.D..    Electronically signed by: Duane Muro (Scribe), 3/25/2020    IGil M.D. personally performed the services described in this documentation, as scribed by Duane Muro in my presence, and it is both accurate and complete. C.    The note accurately reflects work and decisions made by me.  Gil Martinez M.D.  3/25/2020  12:21 PM

## 2020-03-25 NOTE — ED TRIAGE NOTES
walked in on patient on toilet semi conscious state, recognized it as a seizure from previous episodes. she's had several hemorhagic strokes in past, dementia, no unilateral weakness, vision changes or trouble speaking. Heart rate in 50s, vitals stable.

## 2020-04-03 ENCOUNTER — OFFICE VISIT (OUTPATIENT)
Dept: MEDICAL GROUP | Age: 85
End: 2020-04-03
Payer: MEDICARE

## 2020-04-03 ENCOUNTER — HOSPITAL ENCOUNTER (OUTPATIENT)
Facility: MEDICAL CENTER | Age: 85
End: 2020-04-03
Attending: FAMILY MEDICINE
Payer: MEDICARE

## 2020-04-03 VITALS
WEIGHT: 121 LBS | SYSTOLIC BLOOD PRESSURE: 107 MMHG | DIASTOLIC BLOOD PRESSURE: 67 MMHG | BODY MASS INDEX: 20.66 KG/M2 | OXYGEN SATURATION: 99 % | HEIGHT: 64 IN | HEART RATE: 81 BPM

## 2020-04-03 DIAGNOSIS — N30.00 ACUTE CYSTITIS WITHOUT HEMATURIA: ICD-10-CM

## 2020-04-03 DIAGNOSIS — Z86.79 HISTORY OF SUBARACHNOID HEMORRHAGE: ICD-10-CM

## 2020-04-03 DIAGNOSIS — R82.90 URINE ABNORMALITY: ICD-10-CM

## 2020-04-03 DIAGNOSIS — R56.9 SEIZURE (HCC): Primary | ICD-10-CM

## 2020-04-03 DIAGNOSIS — I10 ESSENTIAL HYPERTENSION: ICD-10-CM

## 2020-04-03 DIAGNOSIS — I69.30 HISTORY OF HEMORRHAGIC CEREBROVASCULAR ACCIDENT (CVA) WITH RESIDUAL DEFICIT: ICD-10-CM

## 2020-04-03 LAB
APPEARANCE UR: ABNORMAL
APPEARANCE UR: CLEAR
BACTERIA #/AREA URNS HPF: ABNORMAL /HPF
BILIRUB UR QL STRIP.AUTO: NEGATIVE
BILIRUB UR STRIP-MCNC: NEGATIVE MG/DL
COLOR UR AUTO: YELLOW
COLOR UR: YELLOW
EPI CELLS #/AREA URNS HPF: ABNORMAL /HPF
GLUCOSE UR STRIP.AUTO-MCNC: NEGATIVE MG/DL
GLUCOSE UR STRIP.AUTO-MCNC: NEGATIVE MG/DL
HYALINE CASTS #/AREA URNS LPF: ABNORMAL /LPF
KETONES UR STRIP.AUTO-MCNC: NEGATIVE MG/DL
KETONES UR STRIP.AUTO-MCNC: NEGATIVE MG/DL
LEUKOCYTE ESTERASE UR QL STRIP.AUTO: ABNORMAL
LEUKOCYTE ESTERASE UR QL STRIP.AUTO: NORMAL
MICRO URNS: ABNORMAL
NITRITE UR QL STRIP.AUTO: POSITIVE
NITRITE UR QL STRIP.AUTO: POSITIVE
PH UR STRIP.AUTO: 5.5 [PH] (ref 5–8)
PH UR STRIP.AUTO: 5.5 [PH] (ref 5–8)
PROT UR QL STRIP: NEGATIVE MG/DL
PROT UR QL STRIP: NEGATIVE MG/DL
RBC # URNS HPF: ABNORMAL /HPF
RBC UR QL AUTO: NEGATIVE
RBC UR QL AUTO: NORMAL
SP GR UR STRIP.AUTO: 1.02
SP GR UR STRIP.AUTO: 1.02
UROBILINOGEN UR STRIP-MCNC: NORMAL MG/DL
UROBILINOGEN UR STRIP.AUTO-MCNC: 0.2 MG/DL
WBC #/AREA URNS HPF: ABNORMAL /HPF

## 2020-04-03 PROCEDURE — 99214 OFFICE O/P EST MOD 30 MIN: CPT | Mod: CR,95 | Performed by: FAMILY MEDICINE

## 2020-04-03 PROCEDURE — 87186 SC STD MICRODIL/AGAR DIL: CPT

## 2020-04-03 PROCEDURE — 81001 URINALYSIS AUTO W/SCOPE: CPT

## 2020-04-03 PROCEDURE — 87077 CULTURE AEROBIC IDENTIFY: CPT

## 2020-04-03 PROCEDURE — 81002 URINALYSIS NONAUTO W/O SCOPE: CPT | Performed by: FAMILY MEDICINE

## 2020-04-03 PROCEDURE — 87086 URINE CULTURE/COLONY COUNT: CPT

## 2020-04-03 RX ORDER — CEFDINIR 300 MG/1
300 CAPSULE ORAL EVERY 12 HOURS
Qty: 10 CAP | Refills: 0 | Status: SHIPPED | OUTPATIENT
Start: 2020-04-03 | End: 2020-04-03 | Stop reason: SDUPTHER

## 2020-04-03 RX ORDER — CEFDINIR 300 MG/1
300 CAPSULE ORAL EVERY 12 HOURS
Qty: 10 CAP | Refills: 0 | Status: SHIPPED | OUTPATIENT
Start: 2020-04-03 | End: 2020-04-08

## 2020-04-03 ASSESSMENT — FIBROSIS 4 INDEX: FIB4 SCORE: 1.89

## 2020-04-03 NOTE — PROGRESS NOTES
Urine dip done in the clinic does raise concerns for UTI. Pt is informed to  Rx for treatment at her pharmacy.   - Urinalysis, Culture if Indicated; Future  - cefdinir (OMNICEF) 300 MG Cap; Take 1 Cap by mouth every 12 hours for 5 days.  Dispense: 10 Cap; Refill: 0

## 2020-04-03 NOTE — PROGRESS NOTES
Telemedicine Visit: Established Patient     This encounter was conducted via [employer].   Verbal consent was obtained. Patient's identity was verified.    Subjective:   CC: ER discharge folow up  Ruchi Camacho is a 85 y.o. female with hx of dementia, hemorrhagic CVA with residual deficits x2, seizure, HTN, and HLD. She was in her normal state of health until 3/25/2020 when her  found her sitting unresponsive on the commode with her eyes closed. Her skin was cool to the touch. He states that she was probably on the commode for about 15 minutes. He called 911 and the patient was transported to the hospital 2 hours later. CT scan and las were unremarkable. Pt was discharged in stable condition. She remains well since. She has been taking all her medications including anti-seizures regularly. She tolerates all medications well, no s/e reported. Her  was bothered by the fact that ER physician did not check her urine for UTI. She had a bout of UTI in 11/2019. She denies any urinary symptoms, fever, chills, flank pain, hematuria. She was recent seen by her neurologist, Dr. Roper.     ROS   Denies any recent fevers or chills. No nausea or vomiting. No chest pains or shortness of breath.     No Known Allergies    Current medicines (including changes today)  Current Outpatient Medications   Medication Sig Dispense Refill   • lacosamide (VIMPAT) 50 MG Tab tablet Take 1 Tab by mouth 2 Times a Day for 180 days. 180 Tab 3   • losartan (COZAAR) 100 MG Tab Take 1 Tab by mouth every day. 90 Tab 3   • rosuvastatin (CRESTOR) 20 MG Tab Take 1 Tab by mouth every evening. 90 Tab 3   • calcium carbonate (TUMS) 500 MG Chew Tab Take 500 mg by mouth every day. BID     • Cholecalciferol (VITAMIN D3) 2000 UNIT Cap Take 3,000 Units by mouth.       No current facility-administered medications for this visit.        Patient Active Problem List    Diagnosis Date Noted   • Vascular dementia without behavioral disturbance (HCC)  "11/07/2019   • History of subarachnoid hemorrhage 03/18/2019   • Essential hypertension 12/20/2018   • complex partial seizure 12/20/2018   • History of hemorrhagic cerebrovascular accident (CVA) with residual deficit x2 12/20/2018   • Osteoporosis_declined treatment 09/26/2017   • Mixed hyperlipidemia 03/27/2013       Family History   Problem Relation Age of Onset   • Heart Failure Mother    • Stroke Father    • Heart Failure Father    • Cancer Sister        She  has a past medical history of Brain bleed (HCC) (2008) and Seizure (HCC).  She  has no past surgical history on file.       Objective:   Vitals obtained by patient:  /67 (BP Location: Right arm, Patient Position: Sitting, BP Cuff Size: Adult) Comment: pt stated  Pulse 81   Ht 1.626 m (5' 4\")   Wt 54.9 kg (121 lb) Comment: pt stated  SpO2 99% Comment: pt stated  BMI 20.77 kg/m²      Physical Exam:  Constitutional: Alert, no distress, well-groomed.  Skin: No rashes in visible areas.  Eye: Round. Conjunctiva clear, lids normal. No icterus.   ENMT: Lips pink without lesions, good dentition, moist mucous membranes. Phonation normal.  Neck: No masses, no thyromegaly. Moves freely without pain.  CV: Pulse as reported by patient  Respiratory: Unlabored respiratory effort, no cough or audible wheeze  Psych: Alert and oriented x3, normal affect and mood.       Assessment and Plan:   The following treatment plan was discussed:     1. History of subarachnoid hemorrhage  2. History of hemorrhagic cerebrovascular accident (CVA) with residual deficit x2  3. complex partial seizure  Ruchi Camacho is a 85 y.o. female with hx of dementia, hemorrhagic CVA with residual deficits x2, seizure, HTN, and HLD who was recently seen by ER on 3/25/2020 for suspected seizure. Her  found her on the commode unresponsive with her eye closed and her skin cool to touch. She was transported to the hospital. Labs and CT scan were negative. Pt was discharged in stable " condition and remains well since. Pt's  is concerned of possible UTI as she has one episode of UTI in 11/2019. Pt is asymptomatic. Urinalysis was not done at the hospital. Urinalysis done by the clinic does raise concerns for UTI.   - pt was advised to  collection cup and drop off urine sample at the clinic for urine dip. I will call him when the results are available.   - pt is taking Vimpat 50 mg BID. Her  does not feel that medication changes are necessary at this time. He wants to continue to observe patient.     4. Essential hypertension  Chronic, currently taking losartan 100 mg qd, she was taking Atenolol 12.5 mg qd which was discontinued at previous OV. Her BP remains at goal per her  who checks her BP daily.   - cont Losartan 100 mg qd  - Pt was counseled on lifestyle modification       5. HLD  - cont Crestor 20 mg qd.   - recommended dietary modification, exercise, and weight loss.        Follow-up: Return for As needed.

## 2020-04-22 ENCOUNTER — APPOINTMENT (OUTPATIENT)
Dept: RADIOLOGY | Facility: MEDICAL CENTER | Age: 85
End: 2020-04-22
Payer: MEDICARE

## 2020-04-22 ENCOUNTER — HOSPITAL ENCOUNTER (EMERGENCY)
Facility: MEDICAL CENTER | Age: 85
End: 2020-04-22
Attending: EMERGENCY MEDICINE
Payer: MEDICARE

## 2020-04-22 ENCOUNTER — APPOINTMENT (OUTPATIENT)
Dept: RADIOLOGY | Facility: MEDICAL CENTER | Age: 85
End: 2020-04-22
Attending: SURGERY
Payer: MEDICARE

## 2020-04-22 ENCOUNTER — APPOINTMENT (OUTPATIENT)
Dept: RADIOLOGY | Facility: MEDICAL CENTER | Age: 85
End: 2020-04-22
Attending: EMERGENCY MEDICINE
Payer: MEDICARE

## 2020-04-22 VITALS
SYSTOLIC BLOOD PRESSURE: 101 MMHG | RESPIRATION RATE: 16 BRPM | OXYGEN SATURATION: 97 % | BODY MASS INDEX: 25.76 KG/M2 | HEIGHT: 62 IN | TEMPERATURE: 98.3 F | WEIGHT: 140 LBS | HEART RATE: 77 BPM | DIASTOLIC BLOOD PRESSURE: 59 MMHG

## 2020-04-22 DIAGNOSIS — S09.90XA CLOSED HEAD INJURY, INITIAL ENCOUNTER: ICD-10-CM

## 2020-04-22 PROCEDURE — 71045 X-RAY EXAM CHEST 1 VIEW: CPT

## 2020-04-22 PROCEDURE — 70450 CT HEAD/BRAIN W/O DYE: CPT

## 2020-04-22 PROCEDURE — 305949 HCHG RED TRAUMA ACT PRE-NOTIFY NO CC

## 2020-04-22 PROCEDURE — 99285 EMERGENCY DEPT VISIT HI MDM: CPT

## 2020-04-22 PROCEDURE — 72125 CT NECK SPINE W/O DYE: CPT

## 2020-04-22 ASSESSMENT — LIFESTYLE VARIABLES
HAVE PEOPLE ANNOYED YOU BY CRITICIZING YOUR DRINKING: NO
EVER FELT BAD OR GUILTY ABOUT YOUR DRINKING: NO
DO YOU DRINK ALCOHOL: NO
DOES PATIENT WANT TO STOP DRINKING: NO
TOTAL SCORE: 0
HAVE YOU EVER FELT YOU SHOULD CUT DOWN ON YOUR DRINKING: NO
EVER HAD A DRINK FIRST THING IN THE MORNING TO STEADY YOUR NERVES TO GET RID OF A HANGOVER: NO
TOTAL SCORE: 0
CONSUMPTION TOTAL: INCOMPLETE
TOTAL SCORE: 0

## 2020-04-23 NOTE — CONSULTS
"Trauma History and Physical  4/22/2020    Attending Physician: Tex Lafleur MD.     CC: Trauma The patient was triaged as a Trauma Red in accordance with established pre hospital protocols. An expeditious primary and secondary survey with required adjuncts was conducted. See trauma narrator for full details.    HPI: This is a 85 y.o. female with dementia who falls frequently and who reportedly sustained a ground level fall. Unsure if she lost consciousness but she had relative hypotension when EMS arrived so she was activated as a trauma red. No signs of trauma on patient and maintained a baseline GCS of 14 throughout.     Past Medical History:   Diagnosis Date   • Dementia (HCC)     Baseline: A&O x1   Further medical history unobtainable    PSHx, outpatient meds, allergies, social history, family history, ROS all unobtainable due to her dementia        Physical Exam:  /55   Pulse 68   Temp 36.8 °C (98.3 °F)   Resp (!) 29   Ht 1.575 m (5' 2\")   Wt 63.5 kg (140 lb)   SpO2 95%     Constitutional: She has red hair and is lying in bed, NAD. Awake, alert, oriented x1 (self). . GCS 14. E4 V4 M6. Speaking with mask off face and only attached at one ear.   Head: No cephalohematoma. Pupils 4-3 reactive bilaterally. Midface stable. No malocclusion.    Neck: No tracheal deviation.   Cardiovascular: Normal rate, regular rhythm.  Pulmonary/Chest: Good air entry and no pain on inspiration..   Abdominal: Nondistended  Musculoskeletal: Right upper extremity grossly atraumatic  Left upper extremity grossly atraumatic  Right lower extremity grossly atraumatic.  Left  lower extremity grossly atraumatic.  Neurological: BLACKMAN, sensation grossly intact in all four extremities.  Skin: Skin is dry.  No diaphoresis. No erythema. No pallor.   Psychiatric:  confused       Labs:                      Radiology:  DX-CHEST-PORTABLE (1 VIEW)   Final Result      1.  Enlarged cardiac silhouette with minimal interstitial prominence. " This could be due to vascular congestion.   2.  There are no findings suggestive of a viral pneumonia at this time.      CT-HEAD W/O   Final Result      1.  Cerebral atrophy.      2.  White matter lucencies most consistent with small vessel ischemic change versus demyelination or gliosis.      3.  Otherwise, Head CT without contrast with no acute findings. No evidence of acute cerebral infarction, hemorrhage or mass lesion.      CT-CSPINE WITHOUT PLUS RECONS   Final Result      1.  There is no acute fracture of the cervical spine.         US-ABORTED US PROCEDURE    (Results Pending)         Assessment: This is a 85 y.o. female with no injuries.  I ordered a CXR to aid in the Covid-19 screening process.  I would recommend a terminal clean for all rooms this patient has been in including trauma north and [facility]. I would also recommend the patient wear a mask at all times until she is ruled out for Covid-19.    Plan: Disposition: defer to ER staff.        Time spent: 66 minutes          Tex Lafleur MD  [phone]

## 2020-04-23 NOTE — DISCHARGE PLANNING
Trauma Response    Referral: Trauma Red Response    Intervention: SW responded to trauma Red.  Pt was BAMBI SAMPSON after [facility].  Pt was baseline(hx of Dementia) upon arrival.  Pts name is Ruchi Camacho (: 1934).  SW obtained the following pt information: Pt was at home when she fell.  SW was able to contact pts  he was sent home due to no visitors. He will come  Pt on discharge. RN has been made aware.     Plan: SW available for Pt/Staff Needs

## 2020-04-23 NOTE — ED NOTES
Unable to get accurate medical hx due to pt's dementia (baseline of A&O x1). Report given to primary nurse, Pari LEMUS RN.

## 2020-04-23 NOTE — ED NOTES
Patient verbalized understanding of discharge instructions, provided with discharge paperwork, gait steady, wheelchair used to assist patient to waiting room, assisted to vehicle by ARIEL Garza.

## 2020-04-23 NOTE — ED TRIAGE NOTES
Chief Complaint   Patient presents with   • Trauma Red     GLF hitting back of head on nightstand; SBP 92 upon EMS arrival     Pt brought in from home by EMS for above complaint. Pt received 250ml NS PTA by EMS.    No injuries noted in trauma bay by ERP.     Pt taken to CT with Trauma RN then will go to [facility].

## 2020-04-23 NOTE — ED NOTES
Patient awake alert and interactive, [place] [address], confused, bed in low position, call light within reach, on room air, attached to cardiac monitor, unlabored breathing noted, no cough noted, interacts with staff, interactions noted as appropriate.

## 2020-04-23 NOTE — ED PROVIDER NOTES
"ED Provider Note    Scribed for Dr. Martinez Ceja M.D. by Jade Kc. 4/22/2020  6:14 PM    Means of arrival: Ambulance  History obtained from: EMS  History limited by: None    CHIEF COMPLAINT  Chief Complaint   Patient presents with   • Trauma Red     GLF hitting back of head on nightstand; SBP 92 upon EMS arrival       HPI  [place] (Ruchi Camacho) is a 85 y.o. female with a history of dementia, seizure, and chronic hypotension who presents to the Emergency Department via ambulance as a Trauma Red for ground level fall that occurred prior to arrival. Per EMS, the patient tripped and hit the back of her head on a nightstand in her home. EMS states the patient was hypotensive at 92/54. En route, she was given 250 mL normal saline, and blood pressure improved to 100/60. EMS reports patient has a history of dementia, chronic hypotensive currently taking hypotensive medications, and seizure. She did not have any seizure activity prior to arrival. Blood glucose was 113. Upon EMS arrival, systolic blood pressure was 92. She denies any headache or musculoskeletal pain. She takes a daily aspirin and is not on any anticoagulants.     REVIEW OF SYSTEMS  Pertinent positives include ground level fall. Pertinent negatives include no headache or musculoskeletal pain. As above, all other systems reviewed and are negative.   See HPI for further details.     PAST MEDICAL HISTORY   has a past medical history of Dementia (HCC).Dementia, seizure, chronic hypotension    SURGICAL HISTORY  Unable to obtain due to emergent nature.     SOCIAL HISTORY  Unable to obtain due to emergent nature.     FAMILY HISTORY  Unable to obtain due to emergent nature.     CURRENT MEDICATIONS  Per EMS, daily aspirin and hypotensive medications.      ALLERGIES  Unable to obtain due to emergent nature.     PHYSICAL EXAM  VITAL SIGNS: /56   Pulse 69   Temp 36.8 °C (98.3 °F)   Resp (!) 29   Ht 1.575 m (5' 2\")   Wt 63.5 kg (140 " lb)   SpO2 97%   BMI 25.61 kg/m²     Constitutional: Well developed, Well nourished, No acute distress, Non-toxic appearance.   HENT: Normocephalic, Atraumatic, Bilateral external ears normal, Oropharynx moist, No oral exudates.   Eyes: PERRLA, EOMI, Conjunctiva normal, No discharge.   Neck: No tenderness, Supple, No stridor.   Lymphatic: No lymphadenopathy noted.   Cardiovascular: Normal heart rate, Normal rhythm.   Thorax & Lungs: Clear to auscultation bilaterally, No respiratory distress, No wheezing, No crackles.   Abdomen: Soft, No tenderness, No masses, No pulsatile masses.   Skin: Warm, Dry, No erythema, No rash.   Extremities:, No edema No cyanosis.   Musculoskeletal: No obvious trauma No tenderness to palpation or major deformities noted.  Intact distal pulses  Neurologic: Confused. Awake, alert and oriented to person only. Answers questions Moves all extremities spontaneously.  Psychiatric: Affect normal, Judgment normal, Mood normal.       RADIOLOGY  DX-CHEST-PORTABLE (1 VIEW)   Final Result      1.  Enlarged cardiac silhouette with minimal interstitial prominence. This could be due to vascular congestion.   2.  There are no findings suggestive of a viral pneumonia at this time.      CT-HEAD W/O   Final Result      1.  Cerebral atrophy.      2.  White matter lucencies most consistent with small vessel ischemic change versus demyelination or gliosis.      3.  Otherwise, Head CT without contrast with no acute findings. No evidence of acute cerebral infarction, hemorrhage or mass lesion.      CT-CSPINE WITHOUT PLUS RECONS   Final Result      1.  There is no acute fracture of the cervical spine.         US-ABORTED US PROCEDURE    (Results Pending)     The radiologist's interpretation of all radiological studies have been reviewed by me.    COURSE & MEDICAL DECISION MAKING  Pertinent Labs & Imaging studies reviewed. (See chart for details)    6:05 PM - Patient seen and examined at bedside. Discussed plan of  care with patient. I informed them that imaging will be ordered to evaluate symptoms. Patient is understanding and agreeable with plan.   Ordered DX chest, US aborted ultrasound procedure, CT head, CT Cspine, to evaluate her symptoms.     6:51 PM - Patient was reevaluated at bedside. Patient is resting comfortably in bed. She is requesting for her . Discussed radiology results with the patient and informed them that results were reassuring and negative for any acute fracture, hemorrhage or lesions.     7:05 PM - RN informs me the patient's  will be on his way to the ED in 20 minutes. Patient is advised to follow up with her PCP.The patient will return for new or worsening symptoms and is stable at the time of discharge.      Decision Making:  The patient with dementia at her usual mental status and had a fall and initially found to be hypotensive.  Is therefore triaged as a trauma red and seen myself emergently on arrival.  However on examination I found no evidence of trauma her blood pressures back up to normal range with only administration of 250 cc of fluid.  I do not think she has an acute medical or traumatic abnormality.  Scanning of the head and neck are negative she will be discharged home with her     The patient will return for new or worsening symptoms and is stable at the time of discharge.    The patient is referred to a primary physician for blood pressure management, diabetic screening, and for all other preventative health concerns.      DISPOSITION:  Patient will be discharged home in stable condition.    FOLLOW UP:  Patient is advised to follow up with their PCP      FINAL IMPRESSION  1. Closed head injury, initial encounter          Jade SOSA), am scribing for, and in the presence of, Dr. Martinez Ceja M.D..    Electronically signed by: Jade Perales), 4/22/2020    IDr. Martinez M.D. personally performed the services described in this  documentation, as scribed by Jade Kc in my presence, and it is both accurate and complete. C    The note accurately reflects work and decisions made by me.  Martinez Ceja M.D.  4/24/2020  5:30 PM

## 2020-04-30 ENCOUNTER — PATIENT MESSAGE (OUTPATIENT)
Dept: MEDICAL GROUP | Age: 85
End: 2020-04-30

## 2020-04-30 DIAGNOSIS — R42 DIZZINESS: ICD-10-CM

## 2020-04-30 DIAGNOSIS — I10 ESSENTIAL HYPERTENSION: ICD-10-CM

## 2020-05-04 RX ORDER — LOSARTAN POTASSIUM 50 MG/1
50 TABLET ORAL DAILY
Qty: 90 TAB | Refills: 1 | Status: SHIPPED | OUTPATIENT
Start: 2020-05-04 | End: 2020-06-25 | Stop reason: SDUPTHER

## 2020-05-04 NOTE — PATIENT COMMUNICATION
Spoke to patient who reports that her blood pressure runs around 110/60, sometimes it drops to 98 systolic. I reviewed ER records.   - will reduce Losartan from 100 mg to 50 mg qd  - pt should cont to check BP daily and keep log  - ER did not order urine or blood test, will order CBC, CMP, and urinalysis.   - pt has f/u appointment with me on 5/21, will check in with her then.   Lisa Pratt M.D.

## 2020-05-07 ENCOUNTER — HOSPITAL ENCOUNTER (OUTPATIENT)
Dept: LAB | Facility: MEDICAL CENTER | Age: 85
End: 2020-05-07
Attending: FAMILY MEDICINE
Payer: MEDICARE

## 2020-05-07 DIAGNOSIS — R42 DIZZINESS: ICD-10-CM

## 2020-05-07 LAB
ALBUMIN SERPL BCP-MCNC: 4.2 G/DL (ref 3.2–4.9)
ALBUMIN/GLOB SERPL: 1.4 G/DL
ALP SERPL-CCNC: 67 U/L (ref 30–99)
ALT SERPL-CCNC: 9 U/L (ref 2–50)
ANION GAP SERPL CALC-SCNC: 10 MMOL/L (ref 7–16)
APPEARANCE UR: ABNORMAL
AST SERPL-CCNC: 14 U/L (ref 12–45)
BACTERIA #/AREA URNS HPF: ABNORMAL /HPF
BASOPHILS # BLD AUTO: 0.8 % (ref 0–1.8)
BASOPHILS # BLD: 0.05 K/UL (ref 0–0.12)
BILIRUB SERPL-MCNC: 0.5 MG/DL (ref 0.1–1.5)
BILIRUB UR QL STRIP.AUTO: NEGATIVE
BUN SERPL-MCNC: 20 MG/DL (ref 8–22)
CALCIUM SERPL-MCNC: 9.8 MG/DL (ref 8.5–10.5)
CHLORIDE SERPL-SCNC: 101 MMOL/L (ref 96–112)
CO2 SERPL-SCNC: 26 MMOL/L (ref 20–33)
COLOR UR: YELLOW
CREAT SERPL-MCNC: 0.72 MG/DL (ref 0.5–1.4)
EOSINOPHIL # BLD AUTO: 0.24 K/UL (ref 0–0.51)
EOSINOPHIL NFR BLD: 4 % (ref 0–6.9)
EPI CELLS #/AREA URNS HPF: ABNORMAL /HPF
ERYTHROCYTE [DISTWIDTH] IN BLOOD BY AUTOMATED COUNT: 45.6 FL (ref 35.9–50)
GLOBULIN SER CALC-MCNC: 3 G/DL (ref 1.9–3.5)
GLUCOSE SERPL-MCNC: 102 MG/DL (ref 65–99)
GLUCOSE UR STRIP.AUTO-MCNC: NEGATIVE MG/DL
HCT VFR BLD AUTO: 41.9 % (ref 37–47)
HGB BLD-MCNC: 13.2 G/DL (ref 12–16)
HYALINE CASTS #/AREA URNS LPF: ABNORMAL /LPF
IMM GRANULOCYTES # BLD AUTO: 0.01 K/UL (ref 0–0.11)
IMM GRANULOCYTES NFR BLD AUTO: 0.2 % (ref 0–0.9)
KETONES UR STRIP.AUTO-MCNC: NEGATIVE MG/DL
LEUKOCYTE ESTERASE UR QL STRIP.AUTO: ABNORMAL
LYMPHOCYTES # BLD AUTO: 1.41 K/UL (ref 1–4.8)
LYMPHOCYTES NFR BLD: 23.7 % (ref 22–41)
MCH RBC QN AUTO: 30.3 PG (ref 27–33)
MCHC RBC AUTO-ENTMCNC: 31.5 G/DL (ref 33.6–35)
MCV RBC AUTO: 96.1 FL (ref 81.4–97.8)
MICRO URNS: ABNORMAL
MONOCYTES # BLD AUTO: 0.57 K/UL (ref 0–0.85)
MONOCYTES NFR BLD AUTO: 9.6 % (ref 0–13.4)
MUCOUS THREADS #/AREA URNS HPF: ABNORMAL /HPF
NEUTROPHILS # BLD AUTO: 3.66 K/UL (ref 2–7.15)
NEUTROPHILS NFR BLD: 61.7 % (ref 44–72)
NITRITE UR QL STRIP.AUTO: POSITIVE
NRBC # BLD AUTO: 0 K/UL
NRBC BLD-RTO: 0 /100 WBC
PH UR STRIP.AUTO: 5.5 [PH] (ref 5–8)
PLATELET # BLD AUTO: 197 K/UL (ref 164–446)
PMV BLD AUTO: 10.6 FL (ref 9–12.9)
POTASSIUM SERPL-SCNC: 4.4 MMOL/L (ref 3.6–5.5)
PROT SERPL-MCNC: 7.2 G/DL (ref 6–8.2)
PROT UR QL STRIP: NEGATIVE MG/DL
RBC # BLD AUTO: 4.36 M/UL (ref 4.2–5.4)
RBC # URNS HPF: ABNORMAL /HPF
RBC UR QL AUTO: NEGATIVE
SODIUM SERPL-SCNC: 137 MMOL/L (ref 135–145)
SP GR UR STRIP.AUTO: 1.02
UROBILINOGEN UR STRIP.AUTO-MCNC: 0.2 MG/DL
WBC # BLD AUTO: 5.9 K/UL (ref 4.8–10.8)
WBC #/AREA URNS HPF: ABNORMAL /HPF

## 2020-05-07 PROCEDURE — 80053 COMPREHEN METABOLIC PANEL: CPT

## 2020-05-07 PROCEDURE — 81001 URINALYSIS AUTO W/SCOPE: CPT

## 2020-05-07 PROCEDURE — 36415 COLL VENOUS BLD VENIPUNCTURE: CPT

## 2020-05-07 PROCEDURE — 85025 COMPLETE CBC W/AUTO DIFF WBC: CPT

## 2020-05-08 RX ORDER — SULFAMETHOXAZOLE AND TRIMETHOPRIM 800; 160 MG/1; MG/1
1 TABLET ORAL 2 TIMES DAILY
Qty: 6 TAB | Refills: 0 | Status: SHIPPED | OUTPATIENT
Start: 2020-05-08 | End: 2020-05-11

## 2020-05-21 ENCOUNTER — HOSPITAL ENCOUNTER (OUTPATIENT)
Facility: MEDICAL CENTER | Age: 85
End: 2020-05-21
Attending: FAMILY MEDICINE
Payer: MEDICARE

## 2020-05-21 ENCOUNTER — TELEMEDICINE (OUTPATIENT)
Dept: MEDICAL GROUP | Age: 85
End: 2020-05-21
Payer: MEDICARE

## 2020-05-21 VITALS
SYSTOLIC BLOOD PRESSURE: 122 MMHG | HEART RATE: 66 BPM | BODY MASS INDEX: 21.85 KG/M2 | WEIGHT: 128 LBS | HEIGHT: 64 IN | OXYGEN SATURATION: 94 % | DIASTOLIC BLOOD PRESSURE: 75 MMHG

## 2020-05-21 DIAGNOSIS — I10 ESSENTIAL HYPERTENSION: ICD-10-CM

## 2020-05-21 DIAGNOSIS — N30.00 ACUTE CYSTITIS WITHOUT HEMATURIA: Primary | ICD-10-CM

## 2020-05-21 DIAGNOSIS — N30.00 ACUTE CYSTITIS WITHOUT HEMATURIA: ICD-10-CM

## 2020-05-21 DIAGNOSIS — E78.2 MIXED HYPERLIPIDEMIA: ICD-10-CM

## 2020-05-21 LAB
APPEARANCE UR: ABNORMAL
APPEARANCE UR: NORMAL
BACTERIA #/AREA URNS HPF: ABNORMAL /HPF
BILIRUB UR QL STRIP.AUTO: NEGATIVE
BILIRUB UR STRIP-MCNC: NEGATIVE MG/DL
COLOR UR AUTO: NORMAL
COLOR UR: YELLOW
EPI CELLS #/AREA URNS HPF: NEGATIVE /HPF
GLUCOSE UR STRIP.AUTO-MCNC: NEGATIVE MG/DL
GLUCOSE UR STRIP.AUTO-MCNC: NEGATIVE MG/DL
HYALINE CASTS #/AREA URNS LPF: ABNORMAL /LPF
KETONES UR STRIP.AUTO-MCNC: NEGATIVE MG/DL
KETONES UR STRIP.AUTO-MCNC: NEGATIVE MG/DL
LEUKOCYTE ESTERASE UR QL STRIP.AUTO: ABNORMAL
LEUKOCYTE ESTERASE UR QL STRIP.AUTO: NORMAL
MICRO URNS: ABNORMAL
NITRITE UR QL STRIP.AUTO: NEGATIVE
NITRITE UR QL STRIP.AUTO: POSITIVE
PH UR STRIP.AUTO: 5 [PH] (ref 5–8)
PH UR STRIP.AUTO: 5.5 [PH] (ref 5–8)
PROT UR QL STRIP: NEGATIVE MG/DL
PROT UR QL STRIP: NEGATIVE MG/DL
RBC # URNS HPF: ABNORMAL /HPF
RBC UR QL AUTO: NEGATIVE
RBC UR QL AUTO: NEGATIVE
SP GR UR STRIP.AUTO: 1.01
SP GR UR STRIP.AUTO: 1.01
UROBILINOGEN UR STRIP-MCNC: 0.2 MG/DL
UROBILINOGEN UR STRIP.AUTO-MCNC: 0.2 MG/DL
WBC #/AREA URNS HPF: ABNORMAL /HPF

## 2020-05-21 PROCEDURE — 99214 OFFICE O/P EST MOD 30 MIN: CPT | Mod: 95,CR | Performed by: FAMILY MEDICINE

## 2020-05-21 PROCEDURE — 81002 URINALYSIS NONAUTO W/O SCOPE: CPT | Mod: 95,CR | Performed by: FAMILY MEDICINE

## 2020-05-21 PROCEDURE — 81001 URINALYSIS AUTO W/SCOPE: CPT

## 2020-05-21 ASSESSMENT — FIBROSIS 4 INDEX: FIB4 SCORE: 2.01

## 2020-05-21 NOTE — PROGRESS NOTES
Telemedicine Visit: Established Patient     This encounter was conducted via [employer].   Verbal consent was obtained. Patient's identity was verified.    Subjective:   CC: UTI follow up   Ruchi Camacho is a 85 y.o. female presenting for evaluation and management of:    Pt has been suffering recurrent UTI leading to neurological symptoms and were seen by ER twice over the course of the past few months. She was most recently treated with Bactrim. Pt finished the course of antibiotic and reports feeling well. Her  states that patient drinks 8 oz of water, but she drinks other fluid as well. Total daily fluid intake is about 30 oz. She continues to take all her medications as directed. Her  would like to have repeat urinalysis to make sure the infection is completely gone.     She cont to take Losartan 50 mg and Crestor 20 mg qd for HTN and HLD. She missed her dose of Losartan yesterday. Today, her BP is slightly elevated according to her .     ROS   Denies any recent fevers or chills. No nausea or vomiting. No chest pains or shortness of breath.     No Known Allergies    Current medicines (including changes today)  Current Outpatient Medications   Medication Sig Dispense Refill   • losartan (COZAAR) 50 MG Tab Take 1 Tab by mouth every day. 90 Tab 1   • lacosamide (VIMPAT) 50 MG Tab tablet Take 1 Tab by mouth 2 Times a Day for 180 days. 180 Tab 3   • rosuvastatin (CRESTOR) 20 MG Tab Take 1 Tab by mouth every evening. 90 Tab 3   • calcium carbonate (TUMS) 500 MG Chew Tab Take 500 mg by mouth every day. BID     • Cholecalciferol (VITAMIN D3) 2000 UNIT Cap Take 3,000 Units by mouth.       No current facility-administered medications for this visit.        Patient Active Problem List    Diagnosis Date Noted   • Vascular dementia without behavioral disturbance (HCC) 11/07/2019   • History of subarachnoid hemorrhage 03/18/2019   • Essential hypertension 12/20/2018   • complex partial seizure  "12/20/2018   • History of hemorrhagic cerebrovascular accident (CVA) with residual deficit x2 12/20/2018   • Osteoporosis_declined treatment 09/26/2017   • Mixed hyperlipidemia 03/27/2013       Family History   Problem Relation Age of Onset   • Heart Failure Mother    • Stroke Father    • Heart Failure Father    • Cancer Sister        She  has a past medical history of Brain bleed (HCC) (2008), Dementia (HCC), Seizure (HCC), and Urinary tract infection.  She  has no past surgical history on file.       Objective:   /75 (BP Location: Left arm, Patient Position: Sitting, BP Cuff Size: Adult) Comment: pt took at home  Pulse 66 Comment: pt tool at home  Ht 1.626 m (5' 4\") Comment: pt stated  Wt 58.1 kg (128 lb) Comment: pt took at home  LMP  (LMP Unknown)   SpO2 94% Comment: pt took at home  Breastfeeding No   BMI 21.97 kg/m²     Physical Exam:  Constitutional: Alert, no distress, well-groomed.  Skin: No rashes in visible areas.  Eye: Round. Conjunctiva clear, lids normal. No icterus.   ENMT: Lips pink without lesions, good dentition, moist mucous membranes. Phonation normal.  Neck: No masses, no thyromegaly. Moves freely without pain.  CV: Pulse as reported by patient  Respiratory: Unlabored respiratory effort, no cough or audible wheeze  Psych: Alert and oriented x3, normal affect and mood.       Assessment and Plan:   The following treatment plan was discussed:     1. Essential hypertension  Chronic, controlled with Losartan 50 mg qd, no s/e reported, will continue.    - recommended dietary modification, exercise, and weight loss.    - Pt was counseled on lifestyle modification       2. Acute cystitis without hematuria  Recurrently UTI, most recent episode was couple weeks ago, treated with Bactrim. She completed Abx and reportedly doing well.   - URINALYSIS; Future  - re-enforced adequate hydration    3. Mixed hyperlipidemia  Chronic, controlled with Crestor 20 mg qd, no s/e reported, will continue.   "       Follow-up: Return in about 6 months (around 11/21/2020) for Multiple issues.

## 2020-05-26 DIAGNOSIS — R31.29 MICROSCOPIC HEMATURIA: ICD-10-CM

## 2020-06-22 DIAGNOSIS — R56.9 SEIZURE (HCC): ICD-10-CM

## 2020-06-22 RX ORDER — LACOSAMIDE 50 MG/1
50 TABLET ORAL 2 TIMES DAILY
Qty: 180 TAB | Refills: 3 | Status: SHIPPED | OUTPATIENT
Start: 2020-06-22 | End: 2021-01-01 | Stop reason: SDUPTHER

## 2020-06-23 ENCOUNTER — HOSPITAL ENCOUNTER (OUTPATIENT)
Dept: LAB | Facility: MEDICAL CENTER | Age: 85
End: 2020-06-23
Attending: FAMILY MEDICINE
Payer: MEDICARE

## 2020-06-23 LAB
APPEARANCE UR: ABNORMAL
BACTERIA #/AREA URNS HPF: ABNORMAL /HPF
BILIRUB UR QL STRIP.AUTO: NEGATIVE
CAOX CRY #/AREA URNS HPF: ABNORMAL /HPF
COLOR UR: YELLOW
EPI CELLS #/AREA URNS HPF: ABNORMAL /HPF
GLUCOSE UR STRIP.AUTO-MCNC: NEGATIVE MG/DL
KETONES UR STRIP.AUTO-MCNC: NEGATIVE MG/DL
LEUKOCYTE ESTERASE UR QL STRIP.AUTO: ABNORMAL
MICRO URNS: ABNORMAL
NITRITE UR QL STRIP.AUTO: NEGATIVE
PH UR STRIP.AUTO: 5.5 [PH] (ref 5–8)
PROT UR QL STRIP: NEGATIVE MG/DL
RBC # URNS HPF: ABNORMAL /HPF
RBC UR QL AUTO: NEGATIVE
SP GR UR STRIP.AUTO: 1.02
UROBILINOGEN UR STRIP.AUTO-MCNC: 0.2 MG/DL
WBC #/AREA URNS HPF: ABNORMAL /HPF

## 2020-06-23 PROCEDURE — 81001 URINALYSIS AUTO W/SCOPE: CPT

## 2020-06-25 ENCOUNTER — TELEPHONE (OUTPATIENT)
Dept: MEDICAL GROUP | Age: 85
End: 2020-06-25

## 2020-06-25 DIAGNOSIS — I10 ESSENTIAL HYPERTENSION: ICD-10-CM

## 2020-06-25 RX ORDER — LOSARTAN POTASSIUM 25 MG/1
25 TABLET ORAL DAILY
Qty: 90 TAB | Refills: 1 | Status: SHIPPED | OUTPATIENT
Start: 2020-06-25 | End: 2020-08-27

## 2020-06-25 RX ORDER — LOSARTAN POTASSIUM 25 MG/1
50 TABLET ORAL DAILY
Qty: 90 TAB | Refills: 1 | Status: SHIPPED | OUTPATIENT
Start: 2020-06-25 | End: 2020-06-25 | Stop reason: SDUPTHER

## 2020-06-25 NOTE — TELEPHONE ENCOUNTER
Spoke to patient's . Her home BP has been lower than normal. Pt has couple episodes of near syncope w/o CP, SOB, GARVIN, other neurological symptoms.   - reduced Losartan to 25 mg   - cont home blood pressure monitoring and keep log  - will f/u with pt in 9/2020

## 2020-07-20 ENCOUNTER — HOSPITAL ENCOUNTER (OUTPATIENT)
Facility: MEDICAL CENTER | Age: 85
End: 2020-07-20
Attending: FAMILY MEDICINE
Payer: MEDICARE

## 2020-07-20 DIAGNOSIS — R31.9 HEMATURIA, UNSPECIFIED TYPE: ICD-10-CM

## 2020-07-20 DIAGNOSIS — N39.0 RECURRENT UTI: ICD-10-CM

## 2020-07-20 LAB
BACTERIA #/AREA URNS HPF: NEGATIVE /HPF
EPI CELLS #/AREA URNS HPF: NORMAL /HPF
HYALINE CASTS #/AREA URNS LPF: NORMAL /LPF
RBC # URNS HPF: NORMAL /HPF
WBC #/AREA URNS HPF: NORMAL /HPF

## 2020-07-20 PROCEDURE — 81015 MICROSCOPIC EXAM OF URINE: CPT

## 2020-08-27 ENCOUNTER — OFFICE VISIT (OUTPATIENT)
Dept: MEDICAL GROUP | Age: 85
End: 2020-08-27
Payer: MEDICARE

## 2020-08-27 VITALS
BODY MASS INDEX: 21.85 KG/M2 | HEIGHT: 64 IN | SYSTOLIC BLOOD PRESSURE: 110 MMHG | DIASTOLIC BLOOD PRESSURE: 48 MMHG | WEIGHT: 128 LBS | OXYGEN SATURATION: 93 % | HEART RATE: 77 BPM | TEMPERATURE: 97.7 F

## 2020-08-27 DIAGNOSIS — I83.11 VARICOSE VEINS OF BOTH LOWER EXTREMITIES WITH INFLAMMATION: Primary | ICD-10-CM

## 2020-08-27 DIAGNOSIS — Z02.9 ADMINISTRATIVE ENCOUNTER: ICD-10-CM

## 2020-08-27 DIAGNOSIS — I83.12 VARICOSE VEINS OF BOTH LOWER EXTREMITIES WITH INFLAMMATION: Primary | ICD-10-CM

## 2020-08-27 DIAGNOSIS — I10 ESSENTIAL HYPERTENSION: ICD-10-CM

## 2020-08-27 PROBLEM — I83.93 VARICOSE VEINS OF BOTH LOWER EXTREMITIES: Status: RESOLVED | Noted: 2020-08-27 | Resolved: 2020-08-27

## 2020-08-27 PROBLEM — I83.93 VARICOSE VEINS OF BOTH LOWER EXTREMITIES: Status: ACTIVE | Noted: 2020-08-27

## 2020-08-27 PROCEDURE — 99214 OFFICE O/P EST MOD 30 MIN: CPT | Performed by: FAMILY MEDICINE

## 2020-08-27 RX ORDER — MELOXICAM 7.5 MG/1
7.5 TABLET ORAL DAILY
Qty: 30 TAB | Refills: 0 | Status: SHIPPED | OUTPATIENT
Start: 2020-08-27 | End: 2020-08-27 | Stop reason: SDUPTHER

## 2020-08-27 RX ORDER — MELOXICAM 7.5 MG/1
7.5 TABLET ORAL DAILY
Qty: 30 TAB | Refills: 0 | Status: SHIPPED | OUTPATIENT
Start: 2020-08-27 | End: 2020-09-24 | Stop reason: SDUPTHER

## 2020-08-27 ASSESSMENT — FIBROSIS 4 INDEX: FIB4 SCORE: 2.04

## 2020-08-29 NOTE — PROGRESS NOTES
Subjective:   CC: swollen legs    HPI:     Danielle Camacho is a 86 y.o. female, established patient of the clinic, presents with the following concerns:     Pt has hx of severe varicose veins since the birth of her 2nd child years ago. She underwent venous stripping in the 1950s after the birth of her children. However, she continues to suffer chronic worsening varicose veins. A few days ago, she develops acute redness, induration and tenderness along the course of the varicose veins on both legs (L worse than R). Her  has been giving her Ibuprofen as needed which is helping with her symptoms. She denies fever, chills. Her gait is much slower and difficult due to pain. He states that it is hard for her to ambulate or gets in/out of the car. He requests [employer] handicap placard.     She has hx of HTN, currently taking Losartan 25 mg qd. Her  states that her blood pressure has been low at home. She occasionally has severe dizziness with standing despite efforts to increase hydration. Her  thinks that she might not need anti-HTN any longer.     Current medicines (including changes today)  Current Outpatient Medications   Medication Sig Dispense Refill   • meloxicam (MOBIC) 7.5 MG Tab Take 1 Tab by mouth every day. 30 Tab 0   • lacosamide (VIMPAT) 50 MG Tab tablet Take 1 Tab by mouth 2 Times a Day for 360 days. 180 Tab 3   • rosuvastatin (CRESTOR) 20 MG Tab Take 1 Tab by mouth every evening. 90 Tab 3   • calcium carbonate (TUMS) 500 MG Chew Tab Take 500 mg by mouth every day. BID     • Cholecalciferol (VITAMIN D3) 2000 UNIT Cap Take 3,000 Units by mouth.       No current facility-administered medications for this visit.      She  has a past medical history of Brain bleed (HCC) (2008), Dementia (HCC), Seizure (HCC), and Urinary tract infection.    I personally reviewed patient's problem list, allergies, medications, family hx, social hx with patient and update EPIC.     REVIEW OF  "SYSTEMS:  CONSTITUTIONAL:  Denies night sweats, fatigue, malaise, lethargy, fever or chills.  RESPIRATORY:  Denies cough, wheeze, hemoptysis, or shortness of breath.  CARDIOVASCULAR:  Denies chest pains, palpitations, pedal edema     Objective:     /48 (BP Location: Right arm, Patient Position: Sitting, BP Cuff Size: Adult)   Pulse 77   Temp 36.5 °C (97.7 °F) (Temporal)   Ht 1.626 m (5' 4\")   Wt 58.1 kg (128 lb)   SpO2 93%  Body mass index is 21.97 kg/m².    Physical Exam:  Constitutional: awake, alert, in no distress.  Skin: Warm, dry, good turgor, no rashes, bruises, ulcers in visible areas.  Neck: Trachea midline, no masses, no thyromegaly. No cervical or supraclavicular lymphadenopathy  Respiratory: Unlabored respiratory effort, lungs clear to auscultation, no wheezes, no rales.  Cardiovascular: Normal S1, S2, no murmur, 1+ bilateral pedal edema.  Psych: Oriented x3, affect and mood wnl, intact judgement and insight.   MSK:   - indurated, erythematous, and tender varicose veins on both legs. 1+ bilateral pedal edema.     Assessment and Plan:   The following treatment plan was discussed    1. Essential hypertension  Chronic, currently taking Losartan 25 mg qd. /48 today in clinic. She has low BP at home with occasional orthostatic hypotension despite efforts to drink water.   - discontinued Losartan   -  will cont to check her BP at home and keep log for reassessment.   - pt has appointment with me in 9/2020, will recheck her BP then.   - Pt was counseled on lifestyle modification       2. Varicose veins of both lower extremities with inflammation  - US-EXTREMITY VENOUS LOWER BILAT; Future  - meloxicam (MOBIC) 7.5 MG Tab; Take 1 Tab by mouth every day.  Dispense: 30 Tab; Refill: 0    3. Administrative encounter  - [employer] handicap placard application filled out and scanned into media.       Lisa Pratt M.D.      Followup: Return in about 4 weeks (around 9/24/2020) for Hypertension.    Please " note that this dictation was created using voice recognition software. I have made every reasonable attempt to correct obvious errors, but I expect that there are errors of grammar and possibly content that I did not discover before finalizing the note.

## 2020-09-01 ENCOUNTER — HOSPITAL ENCOUNTER (OUTPATIENT)
Dept: RADIOLOGY | Facility: MEDICAL CENTER | Age: 85
End: 2020-09-01
Attending: FAMILY MEDICINE
Payer: MEDICARE

## 2020-09-01 DIAGNOSIS — I83.12 VARICOSE VEINS OF BOTH LOWER EXTREMITIES WITH INFLAMMATION: ICD-10-CM

## 2020-09-01 DIAGNOSIS — I83.11 VARICOSE VEINS OF BOTH LOWER EXTREMITIES WITH INFLAMMATION: ICD-10-CM

## 2020-09-01 PROCEDURE — 93970 EXTREMITY STUDY: CPT

## 2020-09-13 ENCOUNTER — OFFICE VISIT (OUTPATIENT)
Dept: URGENT CARE | Facility: CLINIC | Age: 85
End: 2020-09-13
Payer: MEDICARE

## 2020-09-13 VITALS
WEIGHT: 128 LBS | TEMPERATURE: 98.3 F | RESPIRATION RATE: 12 BRPM | DIASTOLIC BLOOD PRESSURE: 80 MMHG | HEART RATE: 67 BPM | HEIGHT: 64 IN | BODY MASS INDEX: 21.85 KG/M2 | OXYGEN SATURATION: 96 % | SYSTOLIC BLOOD PRESSURE: 128 MMHG

## 2020-09-13 DIAGNOSIS — L03.115 CELLULITIS OF RIGHT LOWER LEG: ICD-10-CM

## 2020-09-13 PROCEDURE — 99213 OFFICE O/P EST LOW 20 MIN: CPT | Performed by: NURSE PRACTITIONER

## 2020-09-13 RX ORDER — DOXYCYCLINE HYCLATE 100 MG
100 TABLET ORAL 2 TIMES DAILY
Qty: 14 TAB | Refills: 0 | Status: SHIPPED | OUTPATIENT
Start: 2020-09-13 | End: 2020-09-20

## 2020-09-13 ASSESSMENT — ENCOUNTER SYMPTOMS
EYE REDNESS: 0
NAUSEA: 0
SHORTNESS OF BREATH: 0
ABDOMINAL PAIN: 0
FEVER: 0
CHILLS: 0
FATIGUE: 0
DIZZINESS: 0
PAIN: 1
SORE THROAT: 0
VOMITING: 0
COUGH: 0
MYALGIAS: 0

## 2020-09-13 ASSESSMENT — FIBROSIS 4 INDEX: FIB4 SCORE: 2.04

## 2020-09-13 NOTE — PROGRESS NOTES
"Subjective:   Danielle Camacho  is a 86 y.o. female who presents for Skin Lesion (onset 2 weeks   red spot on right calf )        Pain  This is a recurrent problem. Episode onset: 2 weeks; right calf was evaluated by PCP ultrasound was complete 9/1- for DVT.  Has been concern of a possible bug bite. The problem occurs constantly. The problem has been gradually worsening. Pertinent negatives include no abdominal pain, chest pain, chills, coughing, fatigue, fever, myalgias, nausea, rash, sore throat or vomiting. Associated symptoms comments: Right calf pain with redness and tenderness. The symptoms are aggravated by walking (Palpation). She has tried NSAIDs for the symptoms. The treatment provided no relief.   Patient has a long history of varicose veins bilaterally  Review of Systems   Constitutional: Negative for chills, fatigue and fever.   HENT: Negative for sore throat.    Eyes: Negative for redness.   Respiratory: Negative for cough and shortness of breath.    Cardiovascular: Negative for chest pain.   Gastrointestinal: Negative for abdominal pain, nausea and vomiting.   Genitourinary: Negative for dysuria.   Musculoskeletal: Negative for myalgias.        Pain , redness right lower extremity    Skin: Negative for rash.   Neurological: Negative for dizziness.     No Known Allergies   Objective:   /80 (BP Location: Right arm, Patient Position: Sitting, BP Cuff Size: Adult)   Pulse 67   Temp 36.8 °C (98.3 °F) (Temporal)   Resp 12   Ht 1.626 m (5' 4\")   Wt 58.1 kg (128 lb)   LMP  (LMP Unknown)   SpO2 96%   BMI 21.97 kg/m²   Physical Exam  Vitals signs and nursing note reviewed.   Constitutional:       General: She is not in acute distress.     Appearance: She is well-developed.   HENT:      Head: Normocephalic and atraumatic.      Right Ear: External ear normal.      Left Ear: External ear normal.      Nose: Nose normal.      Mouth/Throat:      Mouth: Mucous membranes are moist.   Eyes:      " Conjunctiva/sclera: Conjunctivae normal.   Cardiovascular:      Rate and Rhythm: Normal rate.   Pulmonary:      Effort: Pulmonary effort is normal. No respiratory distress.      Breath sounds: Normal breath sounds.   Abdominal:      General: There is no distension.   Musculoskeletal: Normal range of motion.      Right lower leg: She exhibits tenderness. She exhibits no bony tenderness and no swelling. No edema.        Legs:    Skin:     General: Skin is warm and dry.   Neurological:      General: No focal deficit present.      Mental Status: She is alert and oriented to person, place, and time. Mental status is at baseline.      Gait: Gait (gait at baseline) normal.   Psychiatric:         Judgment: Judgment normal.           Assessment/Plan:   1. Cellulitis of right lower leg  - doxycycline (VIBRAMYCIN) 100 MG Tab; Take 1 Tab by mouth 2 times a day for 7 days.  Dispense: 14 Tab; Refill: 0  Patient is an 86-year-old female present with the stated above.  Patient having ongoing right lower calf pain for the past 2 weeks.  Patient was recently evaluated by her PCP ultrasound reviewed from 9/1 which was negative for DVT.  Per the patient's  redness has worsened.  She has been taking meloxicam with no improvement of symptoms.  I am concerned of the redness and induration on exam will start patient on doxycycline twice daily x7 days.  Patient is scheduled to follow-up with her PCP next week. Differential diagnosis, naural history, supportive care, and indications for immediate follow-up discussed.

## 2020-09-24 ENCOUNTER — OFFICE VISIT (OUTPATIENT)
Dept: MEDICAL GROUP | Age: 85
End: 2020-09-24
Payer: MEDICARE

## 2020-09-24 VITALS
DIASTOLIC BLOOD PRESSURE: 64 MMHG | TEMPERATURE: 97.8 F | WEIGHT: 128 LBS | OXYGEN SATURATION: 98 % | BODY MASS INDEX: 21.85 KG/M2 | HEIGHT: 64 IN | HEART RATE: 73 BPM | SYSTOLIC BLOOD PRESSURE: 122 MMHG

## 2020-09-24 DIAGNOSIS — L03.115 CELLULITIS OF RIGHT LOWER EXTREMITY: Primary | ICD-10-CM

## 2020-09-24 DIAGNOSIS — E78.2 MIXED HYPERLIPIDEMIA: ICD-10-CM

## 2020-09-24 DIAGNOSIS — I83.12 VARICOSE VEINS OF BOTH LOWER EXTREMITIES WITH INFLAMMATION: ICD-10-CM

## 2020-09-24 DIAGNOSIS — I83.11 VARICOSE VEINS OF BOTH LOWER EXTREMITIES WITH INFLAMMATION: ICD-10-CM

## 2020-09-24 DIAGNOSIS — F01.50 VASCULAR DEMENTIA WITHOUT BEHAVIORAL DISTURBANCE (HCC): ICD-10-CM

## 2020-09-24 DIAGNOSIS — I10 ESSENTIAL HYPERTENSION: ICD-10-CM

## 2020-09-24 PROCEDURE — 99215 OFFICE O/P EST HI 40 MIN: CPT | Performed by: FAMILY MEDICINE

## 2020-09-24 RX ORDER — CLINDAMYCIN HYDROCHLORIDE 300 MG/1
300 CAPSULE ORAL 3 TIMES DAILY
Qty: 30 CAP | Refills: 0 | Status: SHIPPED | OUTPATIENT
Start: 2020-09-24 | End: 2020-10-04

## 2020-09-24 RX ORDER — MELOXICAM 15 MG/1
15 TABLET ORAL DAILY
Qty: 15 TAB | Refills: 0 | Status: SHIPPED | OUTPATIENT
Start: 2020-09-24 | End: 2020-11-02

## 2020-09-24 ASSESSMENT — FIBROSIS 4 INDEX: FIB4 SCORE: 2.04

## 2020-09-24 NOTE — PROGRESS NOTES
Subjective:   CC: cellulitis of the R leg     HPI:     Danielle Camacho is a 86 y.o. female, established patient of the clinic, presents with the following concerns:     Pt c/o acute development of a red, severely tender skin lesion at the R calf. She was seen by  on 9/13/2020. She was diagnosed with cellulitis and was treated with Doxycyline w/o improvement. She denies fever, chills, hx of trauma to affected area. She takes Meloxicam 7.5 mg daily for pain which takes the edge off. She also take Tylenol as needed.     She has hx of chronic bilateral varicose veins s/p vein stripping in the 1950s after the birth of her 2nd child. She c/o worsening tender varicose veins and would like to have vascular consultation.     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 She works with Dr. Roper. She continues to do well. She is  and lives with her  who is primary caregiver.     She has HTN, previously controlled with pharmacotherapy. However, she has had episodes of hypotension. BP meds were discontinued. She is able to maintain normal BP w/o meds. She is active but does not exercise regularly. No hx of drugs, alcohol, tobacco abuse.     She takes Crestor 20 mg qd for HLD and tolerates it well.     Current medicines (including changes today)  Current Outpatient Medications   Medication Sig Dispense Refill   • clindamycin (CLEOCIN) 300 MG Cap Take 1 Cap by mouth 3 times a day for 10 days. 30 Cap 0   • meloxicam (MOBIC) 15 MG tablet Take 1 Tab by mouth every day. 15 Tab 0   • lacosamide (VIMPAT) 50 MG Tab tablet Take 1 Tab by mouth 2 Times a Day for 360 days. 180 Tab 3   • rosuvastatin (CRESTOR) 20 MG Tab Take 1 Tab by mouth every evening. 90 Tab 3   • calcium carbonate (TUMS) 500 MG Chew Tab Take 500 mg by mouth every day. BID     • Cholecalciferol (VITAMIN D3) 2000 UNIT Cap Take 3,000 Units by mouth.       No current facility-administered medications for this visit.      She  has a past medical  "history of Brain bleed (HCC) (2008), Dementia (HCC), Seizure (HCC), and Urinary tract infection.    I personally reviewed patient's problem list, allergies, medications, family hx, social hx with patient and update EPIC.     REVIEW OF SYSTEMS:  CONSTITUTIONAL:  Denies night sweats, fatigue, malaise, lethargy, fever or chills.  RESPIRATORY:  Denies cough, wheeze, hemoptysis, or shortness of breath.  CARDIOVASCULAR:  Denies chest pains, palpitations, pedal edema     Objective:     /64 (BP Location: Left arm, Patient Position: Sitting, BP Cuff Size: Adult)   Pulse 73   Temp 36.6 °C (97.8 °F) (Temporal)   Ht 1.626 m (5' 4\")   Wt 58.1 kg (128 lb)   SpO2 98%  Body mass index is 21.97 kg/m².    Physical Exam:  Constitutional: awake, alert, in no distress.  Skin: Warm, dry, good turgor, no rashes, bruises, ulcers in visible areas.  - erythematous, tender skin patch at the R scalp measuring approximately 7 cm in diameter.   Eye: conjunctiva clear, lids neg for edema or lesions.  ENMT: TM and auditory canals wnl. Oral and nasal mucosa wnl. Lips without lesions, good dentition, oropharynx clear.  Neck: Trachea midline, no masses, no thyromegaly. No cervical or supraclavicular lymphadenopathy  Respiratory: Unlabored respiratory effort, lungs clear to auscultation, no wheezes, no rales.  Cardiovascular: Normal S1, S2, no murmur, no pedal edema.  Psych: Oriented x3, affect and mood wnl, intact judgement and insight.   MSK: varicose veins in both legs that are tender to touch.       Assessment and Plan:   The following treatment plan was discussed    1. Varicose veins of both lower extremities with inflammation  Hx of varicose veins s/p stripping in the 1950s. Varicose veins are tender and bother some. US negative for clots.   - REFERRAL TO VASCULAR SURGERY  - meloxicam (MOBIC) 15 MG tablet; Take 1 Tab by mouth every day.  Dispense: 15 Tab; Refill: 0    2. Cellulitis of right lower extremity  Hx and exam are concerned " for acute cellulitis, no improvement with Doxycycline provided by  provider.   I am also concerned for possible erythema nodosum.  Will change antibiotic to see if her symptoms would improve. If not, biopsy likely needed to confirm.   - clindamycin (CLEOCIN) 300 MG Cap; Take 1 Cap by mouth 3 times a day for 10 days.  Dispense: 30 Cap; Refill: 0  - meloxicam (MOBIC) 15 MG tablet; Take 1 Tab by mouth every day.  Dispense: 15 Tab; Refill: 0    3. Essential hypertension  Chronic, previously required pharmacotherapy. However, due to frequent episodes of hypotension, anti-HTN meds were discontinued. Pt is able to maintain normal BP w/o med. Neg hx of drugs, alcohol, tobacco abuse.   - will cont to monitor. Caregiver is advised to monitor home BP and contact me as needed.   - CBC WITH DIFFERENTIAL; Future  - Comp Metabolic Panel; Future  - MICROALBUMIN CREAT RATIO URINE; Future    4. Mixed hyperlipidemia  Chronic, controlled with Crestor 20 mg qd, no s/e reported, will continue.    - CBC WITH DIFFERENTIAL; Future  - Comp Metabolic Panel; Future  - Lipid Profile; Future  - recommended dietary modification, exercise, and weight loss.   - avoid alcohol, drugs, tobacco products     5. Vascular dementia without behavioral disturbance (HCC)  Chronic, stable, f/u with Neurology.     Patient was seen for 40 minutes face to face of which greater than 50% of appointment time was spent on counseling and coordination of care regarding the above     Patient was seen for 40 minutes face to face of which greater than 50% of appointment time was spent on counseling and coordination of care regarding the above       Lisa Pratt M.D.      Followup: Return in about 6 months (around 3/24/2021) for Annual wellness visit.    Please note that this dictation was created using voice recognition software. I have made every reasonable attempt to correct obvious errors, but I expect that there are errors of grammar and possibly content that I did  not discover before finalizing the note.

## 2020-10-26 ENCOUNTER — HOSPITAL ENCOUNTER (OUTPATIENT)
Facility: MEDICAL CENTER | Age: 85
End: 2020-10-26
Attending: FAMILY MEDICINE
Payer: MEDICARE

## 2020-10-26 DIAGNOSIS — I10 ESSENTIAL HYPERTENSION: ICD-10-CM

## 2020-10-26 LAB
CREAT UR-MCNC: 51.79 MG/DL
MICROALBUMIN UR-MCNC: <1.2 MG/DL
MICROALBUMIN/CREAT UR: NORMAL MG/G (ref 0–30)

## 2020-10-26 PROCEDURE — 81003 URINALYSIS AUTO W/O SCOPE: CPT

## 2020-10-26 PROCEDURE — 82043 UR ALBUMIN QUANTITATIVE: CPT

## 2020-10-26 PROCEDURE — 82570 ASSAY OF URINE CREATININE: CPT

## 2020-10-27 DIAGNOSIS — R30.0 DYSURIA: ICD-10-CM

## 2020-10-28 ENCOUNTER — HOSPITAL ENCOUNTER (OUTPATIENT)
Facility: MEDICAL CENTER | Age: 85
End: 2020-10-28
Attending: FAMILY MEDICINE
Payer: MEDICARE

## 2020-10-28 DIAGNOSIS — R30.0 DYSURIA: ICD-10-CM

## 2020-10-28 LAB
APPEARANCE UR: ABNORMAL
BACTERIA #/AREA URNS HPF: NEGATIVE /HPF
BILIRUB UR QL STRIP.AUTO: NEGATIVE
CAOX CRY #/AREA URNS HPF: ABNORMAL /HPF
COLOR UR: YELLOW
EPI CELLS #/AREA URNS HPF: ABNORMAL /HPF
GLUCOSE UR STRIP.AUTO-MCNC: NEGATIVE MG/DL
HYALINE CASTS #/AREA URNS LPF: ABNORMAL /LPF
KETONES UR STRIP.AUTO-MCNC: NEGATIVE MG/DL
LEUKOCYTE ESTERASE UR QL STRIP.AUTO: NEGATIVE
MICRO URNS: ABNORMAL
NITRITE UR QL STRIP.AUTO: NEGATIVE
PH UR STRIP.AUTO: 5 [PH] (ref 5–8)
PROT UR QL STRIP: NEGATIVE MG/DL
RBC # URNS HPF: ABNORMAL /HPF
RBC UR QL AUTO: NEGATIVE
SP GR UR STRIP.AUTO: 1.02
UROBILINOGEN UR STRIP.AUTO-MCNC: 0.2 MG/DL
WBC #/AREA URNS HPF: ABNORMAL /HPF

## 2020-10-28 PROCEDURE — 81001 URINALYSIS AUTO W/SCOPE: CPT

## 2020-11-01 DIAGNOSIS — E78.00 PURE HYPERCHOLESTEROLEMIA: ICD-10-CM

## 2020-11-02 ENCOUNTER — OFFICE VISIT (OUTPATIENT)
Dept: MEDICAL GROUP | Age: 85
End: 2020-11-02
Payer: MEDICARE

## 2020-11-02 ENCOUNTER — HOSPITAL ENCOUNTER (OUTPATIENT)
Dept: LAB | Facility: MEDICAL CENTER | Age: 85
End: 2020-11-02
Attending: FAMILY MEDICINE
Payer: MEDICARE

## 2020-11-02 VITALS
OXYGEN SATURATION: 95 % | SYSTOLIC BLOOD PRESSURE: 130 MMHG | BODY MASS INDEX: 21.34 KG/M2 | HEIGHT: 64 IN | TEMPERATURE: 98.2 F | HEART RATE: 64 BPM | DIASTOLIC BLOOD PRESSURE: 68 MMHG | WEIGHT: 125 LBS

## 2020-11-02 DIAGNOSIS — Z23 NEED FOR VACCINATION: ICD-10-CM

## 2020-11-02 DIAGNOSIS — L52 ERYTHEMA NODOSUM: ICD-10-CM

## 2020-11-02 DIAGNOSIS — R22.43 LEG MASS, BILATERAL: Primary | ICD-10-CM

## 2020-11-02 LAB
ALBUMIN SERPL BCP-MCNC: 4.3 G/DL (ref 3.2–4.9)
ALBUMIN/GLOB SERPL: 1.5 G/DL
ALP SERPL-CCNC: 54 U/L (ref 30–99)
ALT SERPL-CCNC: 12 U/L (ref 2–50)
ANION GAP SERPL CALC-SCNC: 8 MMOL/L (ref 7–16)
AST SERPL-CCNC: 15 U/L (ref 12–45)
BASOPHILS # BLD AUTO: 0.7 % (ref 0–1.8)
BASOPHILS # BLD: 0.05 K/UL (ref 0–0.12)
BILIRUB SERPL-MCNC: 0.5 MG/DL (ref 0.1–1.5)
BUN SERPL-MCNC: 20 MG/DL (ref 8–22)
CALCIUM SERPL-MCNC: 9.8 MG/DL (ref 8.5–10.5)
CHLORIDE SERPL-SCNC: 105 MMOL/L (ref 96–112)
CO2 SERPL-SCNC: 28 MMOL/L (ref 20–33)
CREAT SERPL-MCNC: 0.58 MG/DL (ref 0.5–1.4)
CRP SERPL HS-MCNC: 0.04 MG/DL (ref 0–0.75)
EOSINOPHIL # BLD AUTO: 0.16 K/UL (ref 0–0.51)
EOSINOPHIL NFR BLD: 2.3 % (ref 0–6.9)
ERYTHROCYTE [DISTWIDTH] IN BLOOD BY AUTOMATED COUNT: 48 FL (ref 35.9–50)
ERYTHROCYTE [SEDIMENTATION RATE] IN BLOOD BY WESTERGREN METHOD: 10 MM/HOUR (ref 0–30)
GLOBULIN SER CALC-MCNC: 2.8 G/DL (ref 1.9–3.5)
GLUCOSE SERPL-MCNC: 97 MG/DL (ref 65–99)
HCT VFR BLD AUTO: 39.6 % (ref 37–47)
HGB BLD-MCNC: 12.2 G/DL (ref 12–16)
IMM GRANULOCYTES # BLD AUTO: 0.01 K/UL (ref 0–0.11)
IMM GRANULOCYTES NFR BLD AUTO: 0.1 % (ref 0–0.9)
LYMPHOCYTES # BLD AUTO: 1.77 K/UL (ref 1–4.8)
LYMPHOCYTES NFR BLD: 25.6 % (ref 22–41)
MCH RBC QN AUTO: 29.5 PG (ref 27–33)
MCHC RBC AUTO-ENTMCNC: 30.8 G/DL (ref 33.6–35)
MCV RBC AUTO: 95.7 FL (ref 81.4–97.8)
MONOCYTES # BLD AUTO: 0.58 K/UL (ref 0–0.85)
MONOCYTES NFR BLD AUTO: 8.4 % (ref 0–13.4)
NEUTROPHILS # BLD AUTO: 4.35 K/UL (ref 2–7.15)
NEUTROPHILS NFR BLD: 62.9 % (ref 44–72)
NRBC # BLD AUTO: 0 K/UL
NRBC BLD-RTO: 0 /100 WBC
PLATELET # BLD AUTO: 192 K/UL (ref 164–446)
PMV BLD AUTO: 10.9 FL (ref 9–12.9)
POTASSIUM SERPL-SCNC: 3.7 MMOL/L (ref 3.6–5.5)
PROT SERPL-MCNC: 7.1 G/DL (ref 6–8.2)
RBC # BLD AUTO: 4.14 M/UL (ref 4.2–5.4)
SODIUM SERPL-SCNC: 141 MMOL/L (ref 135–145)
WBC # BLD AUTO: 6.9 K/UL (ref 4.8–10.8)

## 2020-11-02 PROCEDURE — G0008 ADMIN INFLUENZA VIRUS VAC: HCPCS | Performed by: FAMILY MEDICINE

## 2020-11-02 PROCEDURE — 80053 COMPREHEN METABOLIC PANEL: CPT

## 2020-11-02 PROCEDURE — 86060 ANTISTREPTOLYSIN O TITER: CPT

## 2020-11-02 PROCEDURE — 86140 C-REACTIVE PROTEIN: CPT

## 2020-11-02 PROCEDURE — 36415 COLL VENOUS BLD VENIPUNCTURE: CPT

## 2020-11-02 PROCEDURE — 99214 OFFICE O/P EST MOD 30 MIN: CPT | Mod: 25 | Performed by: FAMILY MEDICINE

## 2020-11-02 PROCEDURE — 86480 TB TEST CELL IMMUN MEASURE: CPT

## 2020-11-02 PROCEDURE — 85025 COMPLETE CBC W/AUTO DIFF WBC: CPT

## 2020-11-02 PROCEDURE — 90662 IIV NO PRSV INCREASED AG IM: CPT | Performed by: FAMILY MEDICINE

## 2020-11-02 PROCEDURE — 85652 RBC SED RATE AUTOMATED: CPT

## 2020-11-02 RX ORDER — ROSUVASTATIN CALCIUM 20 MG/1
TABLET, COATED ORAL
Qty: 90 TAB | Refills: 3 | Status: SHIPPED | OUTPATIENT
Start: 2020-11-02

## 2020-11-02 ASSESSMENT — FIBROSIS 4 INDEX: FIB4 SCORE: 2.04

## 2020-11-02 NOTE — PROGRESS NOTES
"Subjective:   CC: leg lesions     HPI:     Danielle Camacho is a 86 y.o. female, established patient of the clinic, presents with the following concerns:     Pt was in her normal state of health until a few months ago when she develops lumpy lesions on her leg bilaterally with mild bilateral pedal edema. On of such lesion at the R calf is tender and markedly erythematous. She was treated for cellulitis x3 times with 3 different antibiotics, but her symptoms are only slightly improved.     Neg fever, chills, nausea, vomiting, respiratory symptoms. She was recently seen by vascular medicine for venous insufficiency and varicose veins where was again treated with Bactrim for cellulitis w/o improve.     Current medicines (including changes today)  Current Outpatient Medications   Medication Sig Dispense Refill   • lacosamide (VIMPAT) 50 MG Tab tablet Take 1 Tab by mouth 2 Times a Day for 360 days. 180 Tab 3   • rosuvastatin (CRESTOR) 20 MG Tab Take 1 Tab by mouth every evening. 90 Tab 3   • calcium carbonate (TUMS) 500 MG Chew Tab Take 500 mg by mouth every day. BID     • Cholecalciferol (VITAMIN D3) 2000 UNIT Cap Take 3,000 Units by mouth.       No current facility-administered medications for this visit.      She  has a past medical history of Brain bleed (HCC) (2008), Dementia (HCC), Seizure (HCC), and Urinary tract infection.    I personally reviewed patient's problem list, allergies, medications, family hx, social hx with patient and update EPIC.     REVIEW OF SYSTEMS:  CONSTITUTIONAL:  Denies night sweats, fatigue, malaise, lethargy, fever or chills.  RESPIRATORY:  Denies cough, wheeze, hemoptysis, or shortness of breath.  CARDIOVASCULAR:  Denies chest pains, palpitations, pedal edema     Objective:     /68 (BP Location: Left arm, Patient Position: Sitting, BP Cuff Size: Adult)   Pulse 64   Temp 36.8 °C (98.2 °F) (Temporal)   Ht 1.626 m (5' 4\")   Wt 56.7 kg (125 lb)   SpO2 95%  Body mass index is " 21.46 kg/m².    Physical Exam:  Constitutional: awake, alert, in no distress.  Skin: Warm, dry, good turgor, no rashes, bruises, ulcers in visible areas.  - lumpy, tender, erythematous skin mass at the R calf measuring approximately 7-9 cm in diameter.   - lumpy, slightly tender, non-erythematous skin masses palpable at the soft tissue of the shins and calf bilaterally  Eye: conjunctiva clear, lids neg for edema or lesions.  Neck: Trachea midline, no masses, no thyromegaly. No cervical or supraclavicular lymphadenopathy  Respiratory: Unlabored respiratory effort, lungs clear to auscultation, no wheezes, no rales.  Cardiovascular: Normal S1, S2, no murmur, 1+ pedal edema bilaterally.  Psych: Oriented x3, affect and mood wnl, intact judgement and insight.       Assessment and Plan:   The following treatment plan was discussed    1. Leg mass, bilateral  2. Erythema nodosum  Hx & exam are concerning for erythema nodosum.   She was treated for cellulitis x3 w/o improvement.   Will schedule appointment for biopsy.  - ASO TITER; Future  - CBC WITH DIFFERENTIAL; Future  - Comp Metabolic Panel; Future  - Sed Rate; Future  - DX-CHEST-2 VIEWS; Future  - Quantiferon Gold Plus TB (4 tube); Future  - CRP QUANTITIVE (NON-CARDIAC); Future    3. Need for vaccination  - INFLUENZA VACCINE, HIGH DOSE (65+ ONLY)    Patient was seen for 25 minutes face to face of which greater than 50% of appointment time was spent on counseling and coordination of care regarding the above     Ly DARREN Pratt M.D.      Followup: Return for biopsy .    Please note that this dictation was created using voice recognition software. I have made every reasonable attempt to correct obvious errors, but I expect that there are errors of grammar and possibly content that I did not discover before finalizing the note.

## 2020-11-03 ENCOUNTER — OFFICE VISIT (OUTPATIENT)
Dept: MEDICAL GROUP | Age: 85
End: 2020-11-03
Payer: MEDICARE

## 2020-11-03 ENCOUNTER — HOSPITAL ENCOUNTER (OUTPATIENT)
Facility: MEDICAL CENTER | Age: 85
End: 2020-11-03
Attending: FAMILY MEDICINE
Payer: MEDICARE

## 2020-11-03 ENCOUNTER — HOSPITAL ENCOUNTER (OUTPATIENT)
Dept: RADIOLOGY | Facility: MEDICAL CENTER | Age: 85
End: 2020-11-03
Attending: FAMILY MEDICINE
Payer: MEDICARE

## 2020-11-03 VITALS
WEIGHT: 124 LBS | HEIGHT: 64 IN | TEMPERATURE: 98.1 F | DIASTOLIC BLOOD PRESSURE: 78 MMHG | SYSTOLIC BLOOD PRESSURE: 116 MMHG | OXYGEN SATURATION: 97 % | BODY MASS INDEX: 21.17 KG/M2 | HEART RATE: 64 BPM

## 2020-11-03 DIAGNOSIS — L52 ERYTHEMA NODOSUM: Primary | ICD-10-CM

## 2020-11-03 DIAGNOSIS — L52 ERYTHEMA NODOSUM: ICD-10-CM

## 2020-11-03 PROCEDURE — 88305 TISSUE EXAM BY PATHOLOGIST: CPT

## 2020-11-03 PROCEDURE — 71046 X-RAY EXAM CHEST 2 VIEWS: CPT

## 2020-11-03 PROCEDURE — 11104 PUNCH BX SKIN SINGLE LESION: CPT | Performed by: FAMILY MEDICINE

## 2020-11-03 RX ORDER — PREDNISONE 20 MG/1
20 TABLET ORAL DAILY
Qty: 10 TAB | Refills: 0 | Status: SHIPPED | OUTPATIENT
Start: 2020-11-03 | End: 2020-11-04 | Stop reason: SDUPTHER

## 2020-11-03 ASSESSMENT — FIBROSIS 4 INDEX: FIB4 SCORE: 1.94

## 2020-11-03 NOTE — PROGRESS NOTES
"Subjective:   CC: bilateral leg masses    HPI:     Danielle Camacho is a 86 y.o. female, established patient of the clinic, presents with the following concerns:     Pt presents with acute development of tender, erythematous soft tissue masses on bilateral legs. One of such leg mass at the R posterior calf is erythematous and markedly tender. She was treated for cellulitis x3 w/o improvement. He symptoms are concerned by EN. Pt presents today for biopsy.     Current medicines (including changes today)  Current Outpatient Medications   Medication Sig Dispense Refill   • predniSONE (DELTASONE) 20 MG Tab Take 1 Tab by mouth every day for 10 days. 10 Tab 0   • rosuvastatin (CRESTOR) 20 MG Tab TAKE 1 TABLET EVERY EVENING 90 Tab 3   • lacosamide (VIMPAT) 50 MG Tab tablet Take 1 Tab by mouth 2 Times a Day for 360 days. 180 Tab 3   • calcium carbonate (TUMS) 500 MG Chew Tab Take 500 mg by mouth every day. BID     • Cholecalciferol (VITAMIN D3) 2000 UNIT Cap Take 3,000 Units by mouth.       No current facility-administered medications for this visit.      She  has a past medical history of Brain bleed ([facility]) (2008), Dementia ([facility]), Seizure ([facility]), and Urinary tract infection.    I personally reviewed patient's problem list, allergies, medications, family hx, social hx with patient and update EPIC.     REVIEW OF SYSTEMS:  CONSTITUTIONAL:  Denies night sweats, fatigue, malaise, lethargy, fever or chills.  RESPIRATORY:  Denies cough, wheeze, hemoptysis, or shortness of breath.  CARDIOVASCULAR:  Denies chest pains, palpitations, pedal edema     Objective:     /78 (BP Location: Right arm, Patient Position: Sitting, BP Cuff Size: Adult)   Pulse 64   Temp 36.7 °C (98.1 °F) (Temporal)   Ht 1.626 m (5' 4\")   Wt 56.2 kg (124 lb)   SpO2 97%  Body mass index is 21.28 kg/m².    Physical Exam:  Constitutional: awake, alert, in no distress.  Skin: Warm, dry, good turgor, no rashes, bruises, ulcers in visible areas.  - lumpy, " tender, erythematous skin mass at the R calf measuring approximately 7-9 cm in diameter.   - lumpy, slightly tender, non-erythematous skin masses palpable at the soft tissue of the shins and calf bilaterally  Eye: conjunctiva clear, lids neg for edema or lesions.  Psych: Oriented x3, affect and mood wnl, intact judgement and insight.       Assessment and Plan:   The following treatment plan was discussed    1. Erythema nodosum  Hx and exam are concerned for EN. CBC, CMP, CRP, ESR are negative. ASO titer and TB tests are pending. CXR negative for .   - predniSONE (DELTASONE) 20 MG Tab; Take 1 Tab by mouth every day for 10 days.  Dispense: 10 Tab; Refill: 0  - punch biopsy of mass at the R calf: Pathology Specimen; Future    Punch Biopsy Procedure Note  Preprocedure diagnosis: tender, erythematous bilateral leg masses  Postprocedure diagnosis: likely EN  Location: R calf     PARQ held and verbal consent obtained.  The site was cleansed with Povidone-Iodine sticks x 3. Local anesthesia was achieved using 2% Lidocaine with Epi.     Procedure Note:    Punch biopsy of 0.8 cm was obtained. Wound was closed with 2  interrupted Ethilon sutures.  The specimen was submitted to pathology. Wound was dressed with antibiotic ointment and bandage, and aftercare instructions provided to patient. Pt tolerates the procedure well, no immediate complications noted. S/s of infection discussed with patient. Patient is to seek medical attn should these symptoms arise.     Patient to return in 14 days for suture removal.    Lisa Pratt M.D.      Followup: Return in about 2 weeks (around 11/17/2020) for Suture removal.    Please note that this dictation was created using voice recognition software. I have made every reasonable attempt to correct obvious errors, but I expect that there are errors of grammar and possibly content that I did not discover before finalizing the note.

## 2020-11-04 ENCOUNTER — PATIENT MESSAGE (OUTPATIENT)
Dept: MEDICAL GROUP | Age: 85
End: 2020-11-04

## 2020-11-04 DIAGNOSIS — L52 ERYTHEMA NODOSUM: ICD-10-CM

## 2020-11-04 LAB
ASO AB SERPL-ACNC: <55 IU/ML (ref 0–330)
GAMMA INTERFERON BACKGROUND BLD IA-ACNC: 0.04 IU/ML
M TB IFN-G BLD-IMP: NEGATIVE
M TB IFN-G CD4+ BCKGRND COR BLD-ACNC: 0.12 IU/ML
MITOGEN IGNF BCKGRD COR BLD-ACNC: >10 IU/ML
PATHOLOGY CONSULT NOTE: NORMAL
QFT TB2 - NIL TBQ2: 0.1 IU/ML

## 2020-11-04 RX ORDER — PREDNISONE 20 MG/1
20 TABLET ORAL DAILY
Qty: 10 TAB | Refills: 0 | Status: SHIPPED | OUTPATIENT
Start: 2020-11-04 | End: 2020-11-05

## 2020-11-17 PROBLEM — L52 ERYTHEMA NODOSUM: Status: ACTIVE | Noted: 2020-01-01

## 2020-11-17 NOTE — PROGRESS NOTES
Subjective:   CC: erythema nodosum      HPI:     Danielle Camacho is a 86 y.o. female, established patient of the clinic, presents with the following concerns:     Pt was in her normal state of health until a few months ago when she develops lumpy lesions on her leg bilaterally with mild bilateral pedal edema. On of such lesion at the R calf is tender and markedly erythematous. She was treated for cellulitis x3 times with 3 different antibiotics, but her symptoms are only slightly improved. Her presentation is concerned for EN. She was treated with 10 days course of oral Prednisone. She had biopsy of the tender lesion at the R calf about 2 weeks ago. Today, her pain is improving significantly except for mild discoloration and induration of the lesion on R calf. Pathology report as follow:     A. Right lower leg:          Predominantly fat necrosis with cystic degeneration in the           subcutaneous layer with many foamy macrophages and scattered           eosinophils.     COMMENT:   The appearance of the fat resembles nodular cystic fat necrosis but is   lacking the encapsulation. Erythema nodosum is in the differential, but   the inflammatory infiltrate does not seem quite classic since it lacks   neutrophils and evolving granulomas. Clinical correlation between these   two likely diagnoses is recommended.    Pt presents today for suture removal. EN workups were negative.     Pt was diagnosed with complex partial seizure & hx of previous stroke. She is taking Vimpat 50 mg qd managed by neurology. Her  c/o poor sleep and other s/e which he attributes to Vimpat. He wants to try to wean off this medication and observe her symptoms.     EEG 9/2019:  This is an abnormal EEG in the awake and drowsy states,   consistent with mild bilateral cerebral dysfunction, rare left   mid temporal, sharply contoured theta discharges occur, without   clear paroxysmal quality.    MRI brain 5/2019:  1.  No evidence of acute  "territorial infarct, intracranial hemorrhage or mass lesion.  2.  Advanced diffuse cerebral substance loss.  3.  Advanced microangiopathic ischemic change versus demyelination or gliosis.  4.  Relatively extensive patchy and serpiginous areas of gradient echo signal hypointensity along the surface of the bilateral cerebral hemispheres and lining the left lateral ventricle temporal horn likely on the basis of previous subarachnoid   hemorrhage.  5.  Multiple smaller punctate foci of gradient echo signal hypointensity in the high bilateral frontal vertices which could be seen secondary to hypertensive microhemorrhage or amyloid angiopathy.  6.  Left temporal lobe encephalomalacia which could be seen secondary to previous hemorrhagic infarct or trauma.      Current medicines (including changes today)  Current Outpatient Medications   Medication Sig Dispense Refill   • rosuvastatin (CRESTOR) 20 MG Tab TAKE 1 TABLET EVERY EVENING 90 Tab 3   • lacosamide (VIMPAT) 50 MG Tab tablet Take 1 Tab by mouth 2 Times a Day for 360 days. 180 Tab 3   • calcium carbonate (TUMS) 500 MG Chew Tab Take 500 mg by mouth every day. BID     • Cholecalciferol (VITAMIN D3) 2000 UNIT Cap Take 3,000 Units by mouth.       No current facility-administered medications for this visit.      She  has a past medical history of Brain bleed (HCC) (2008), Dementia (HCC), Seizure (HCC), and Urinary tract infection.    I personally reviewed patient's problem list, allergies, medications, family hx, social hx with patient and update EPIC.     REVIEW OF SYSTEMS:  CONSTITUTIONAL:  Denies night sweats, fatigue, malaise, lethargy, fever or chills.  RESPIRATORY:  Denies cough, wheeze, hemoptysis, or shortness of breath.  CARDIOVASCULAR:  Denies chest pains, palpitations, pedal edema     Objective:     /72 (BP Location: Right arm, Patient Position: Sitting, BP Cuff Size: Child)   Pulse 66   Temp 36.5 °C (97.7 °F) (Temporal)   Ht 1.626 m (5' 4\")   Wt 56.1 " kg (123 lb 9.6 oz)   SpO2 96%  Body mass index is 21.22 kg/m².    Physical Exam:  Constitutional: awake, alert, in no distress.  Skin: Warm, dry, good turgor, no rashes, bruises, ulcers in visible areas.  - surgical wound on R calf is healing well, neg bleeding or discharge. Sutures x2 are intact. Mild residue redness and induration noted around the wound.   Eye: conjunctiva clear, lids neg for edema or lesions.  Neck: Trachea midline, no masses, no thyromegaly. No cervical or supraclavicular lymphadenopathy  Respiratory: Unlabored respiratory effort, lungs clear to auscultation, no wheezes, no rales.  Cardiovascular: Normal S1, S2, no murmur, no pedal edema.  Psych: Oriented x3, affect and mood wnl, intact judgement and insight.       Assessment and Plan:   The following treatment plan was discussed    1. Erythema nodosum  Improving with 10-day course of oral Prednisone  Discussed labs and X-ray findings with patient and her .   Discussed pathogenesis of this condition and prognosis.   Will cont to monitor.     2. complex partial seizure  - f/u with Dr. Roper.   - cont vimpat 50 mg qd.     3. Visit for suture removal  Sutures removed by myself.   Pt tolerates the procedure well, no s/e reported.     Patient was seen for 25 minutes face to face of which greater than 50% of appointment time was spent on counseling and coordination of care regarding the above       Ly DARREN Pratt M.D.      Followup: Return for As needed.    Please note that this dictation was created using voice recognition software. I have made every reasonable attempt to correct obvious errors, but I expect that there are errors of grammar and possibly content that I did not discover before finalizing the note.

## 2020-12-10 NOTE — ED NOTES
[facility] called patient and left a message on her cell about the program. Additional attempts at contact will be made.     Thank you,  Anish Kunz MD at bedside.

## 2021-01-01 ENCOUNTER — TELEPHONE (OUTPATIENT)
Dept: NEUROLOGY | Facility: MEDICAL CENTER | Age: 86
End: 2021-01-01

## 2021-01-01 ENCOUNTER — TELEPHONE (OUTPATIENT)
Dept: MEDICAL GROUP | Age: 86
End: 2021-01-01

## 2021-01-01 ENCOUNTER — PHYSICAL THERAPY (OUTPATIENT)
Dept: PHYSICAL THERAPY | Facility: MEDICAL CENTER | Age: 86
End: 2021-01-01
Attending: FAMILY MEDICINE
Payer: MEDICARE

## 2021-01-01 ENCOUNTER — HOSPITAL ENCOUNTER (OUTPATIENT)
Facility: MEDICAL CENTER | Age: 86
End: 2021-11-16
Attending: EMERGENCY MEDICINE | Admitting: HOSPITALIST
Payer: MEDICARE

## 2021-01-01 ENCOUNTER — APPOINTMENT (OUTPATIENT)
Dept: RADIOLOGY | Facility: MEDICAL CENTER | Age: 86
End: 2021-01-01
Attending: EMERGENCY MEDICINE
Payer: MEDICARE

## 2021-01-01 ENCOUNTER — PATIENT MESSAGE (OUTPATIENT)
Dept: MEDICAL GROUP | Age: 86
End: 2021-01-01

## 2021-01-01 ENCOUNTER — NON-PROVIDER VISIT (OUTPATIENT)
Dept: NEUROLOGY | Facility: MEDICAL CENTER | Age: 86
End: 2021-01-01
Attending: PSYCHIATRY & NEUROLOGY
Payer: MEDICARE

## 2021-01-01 ENCOUNTER — OFFICE VISIT (OUTPATIENT)
Dept: MEDICAL GROUP | Age: 86
End: 2021-01-01
Payer: MEDICARE

## 2021-01-01 ENCOUNTER — HOSPITAL ENCOUNTER (OUTPATIENT)
Dept: LAB | Facility: MEDICAL CENTER | Age: 86
End: 2021-11-12
Attending: FAMILY MEDICINE
Payer: MEDICARE

## 2021-01-01 ENCOUNTER — NON-PROVIDER VISIT (OUTPATIENT)
Dept: MEDICAL GROUP | Age: 86
End: 2021-01-01
Payer: MEDICARE

## 2021-01-01 ENCOUNTER — IMMUNIZATION (OUTPATIENT)
Dept: FAMILY PLANNING/WOMEN'S HEALTH CLINIC | Facility: IMMUNIZATION CENTER | Age: 86
End: 2021-01-01
Payer: MEDICARE

## 2021-01-01 ENCOUNTER — PATIENT MESSAGE (OUTPATIENT)
Dept: NEUROLOGY | Facility: MEDICAL CENTER | Age: 86
End: 2021-01-01

## 2021-01-01 ENCOUNTER — APPOINTMENT (OUTPATIENT)
Dept: PHYSICAL THERAPY | Facility: MEDICAL CENTER | Age: 86
End: 2021-01-01
Payer: MEDICARE

## 2021-01-01 ENCOUNTER — HOSPITAL ENCOUNTER (OUTPATIENT)
Dept: LAB | Facility: MEDICAL CENTER | Age: 86
End: 2021-04-13
Attending: FAMILY MEDICINE
Payer: MEDICARE

## 2021-01-01 ENCOUNTER — HOSPITAL ENCOUNTER (OUTPATIENT)
Facility: MEDICAL CENTER | Age: 86
End: 2021-01-12
Attending: FAMILY MEDICINE
Payer: MEDICARE

## 2021-01-01 ENCOUNTER — HOSPITAL ENCOUNTER (OUTPATIENT)
Facility: MEDICAL CENTER | Age: 86
End: 2021-02-16
Attending: NURSE PRACTITIONER
Payer: MEDICARE

## 2021-01-01 VITALS
HEIGHT: 61 IN | RESPIRATION RATE: 22 BRPM | WEIGHT: 134.48 LBS | SYSTOLIC BLOOD PRESSURE: 61 MMHG | HEART RATE: 132 BPM | BODY MASS INDEX: 25.39 KG/M2 | DIASTOLIC BLOOD PRESSURE: 41 MMHG | TEMPERATURE: 97 F | OXYGEN SATURATION: 70 %

## 2021-01-01 VITALS
SYSTOLIC BLOOD PRESSURE: 140 MMHG | OXYGEN SATURATION: 94 % | WEIGHT: 124 LBS | DIASTOLIC BLOOD PRESSURE: 70 MMHG | HEIGHT: 64 IN | BODY MASS INDEX: 21.17 KG/M2 | HEART RATE: 68 BPM | TEMPERATURE: 98 F

## 2021-01-01 VITALS
DIASTOLIC BLOOD PRESSURE: 60 MMHG | HEIGHT: 64 IN | SYSTOLIC BLOOD PRESSURE: 126 MMHG | TEMPERATURE: 97.7 F | BODY MASS INDEX: 21.85 KG/M2 | OXYGEN SATURATION: 93 % | WEIGHT: 128 LBS | HEART RATE: 71 BPM

## 2021-01-01 VITALS
HEIGHT: 64 IN | OXYGEN SATURATION: 95 % | SYSTOLIC BLOOD PRESSURE: 118 MMHG | WEIGHT: 128.75 LBS | BODY MASS INDEX: 21.98 KG/M2 | TEMPERATURE: 97.5 F | DIASTOLIC BLOOD PRESSURE: 60 MMHG | HEART RATE: 74 BPM

## 2021-01-01 DIAGNOSIS — N39.0 FREQUENT UTI: ICD-10-CM

## 2021-01-01 DIAGNOSIS — I10 ESSENTIAL HYPERTENSION: ICD-10-CM

## 2021-01-01 DIAGNOSIS — Z91.81 RISK FOR FALLS: ICD-10-CM

## 2021-01-01 DIAGNOSIS — Z00.00 MEDICARE ANNUAL WELLNESS VISIT, SUBSEQUENT: ICD-10-CM

## 2021-01-01 DIAGNOSIS — R30.0 DYSURIA: ICD-10-CM

## 2021-01-01 DIAGNOSIS — M81.0 AGE-RELATED OSTEOPOROSIS WITHOUT CURRENT PATHOLOGICAL FRACTURE: ICD-10-CM

## 2021-01-01 DIAGNOSIS — E78.2 MIXED HYPERLIPIDEMIA: ICD-10-CM

## 2021-01-01 DIAGNOSIS — Z23 NEED FOR VACCINATION: ICD-10-CM

## 2021-01-01 DIAGNOSIS — R73.03 PREDIABETES: ICD-10-CM

## 2021-01-01 DIAGNOSIS — H61.23 BILATERAL IMPACTED CERUMEN: ICD-10-CM

## 2021-01-01 DIAGNOSIS — R56.9 SEIZURE (HCC): ICD-10-CM

## 2021-01-01 DIAGNOSIS — I69.30 HISTORY OF HEMORRHAGIC CEREBROVASCULAR ACCIDENT (CVA) WITH RESIDUAL DEFICIT: ICD-10-CM

## 2021-01-01 DIAGNOSIS — G40.209 PARTIAL SYMPTOMATIC EPILEPSY WITH COMPLEX PARTIAL SEIZURES, NOT INTRACTABLE, WITHOUT STATUS EPILEPTICUS (HCC): Primary | ICD-10-CM

## 2021-01-01 DIAGNOSIS — Z23 ENCOUNTER FOR VACCINATION: Primary | ICD-10-CM

## 2021-01-01 DIAGNOSIS — L52 ERYTHEMA NODOSUM: Primary | ICD-10-CM

## 2021-01-01 DIAGNOSIS — N30.00 ACUTE CYSTITIS WITHOUT HEMATURIA: ICD-10-CM

## 2021-01-01 DIAGNOSIS — R56.9 SEIZURE (HCC): Primary | ICD-10-CM

## 2021-01-01 DIAGNOSIS — F01.50 VASCULAR DEMENTIA WITHOUT BEHAVIORAL DISTURBANCE (HCC): ICD-10-CM

## 2021-01-01 DIAGNOSIS — I62.9 INTRACRANIAL HEMORRHAGE (HCC): ICD-10-CM

## 2021-01-01 DIAGNOSIS — I87.2 VENOUS INSUFFICIENCY: ICD-10-CM

## 2021-01-01 LAB
ABO GROUP BLD: NORMAL
ALBUMIN SERPL BCP-MCNC: 4.1 G/DL (ref 3.2–4.9)
ALBUMIN SERPL BCP-MCNC: 4.2 G/DL (ref 3.2–4.9)
ALBUMIN SERPL BCP-MCNC: 4.4 G/DL (ref 3.2–4.9)
ALBUMIN/GLOB SERPL: 1.4 G/DL
ALBUMIN/GLOB SERPL: 1.4 G/DL
ALBUMIN/GLOB SERPL: 1.5 G/DL
ALP SERPL-CCNC: 62 U/L (ref 30–99)
ALP SERPL-CCNC: 67 U/L (ref 30–99)
ALP SERPL-CCNC: 70 U/L (ref 30–99)
ALT SERPL-CCNC: 6 U/L (ref 2–50)
ANION GAP SERPL CALC-SCNC: 10 MMOL/L (ref 7–16)
ANION GAP SERPL CALC-SCNC: 10 MMOL/L (ref 7–16)
ANION GAP SERPL CALC-SCNC: 8 MMOL/L (ref 7–16)
APPEARANCE UR: ABNORMAL
APPEARANCE UR: CLEAR
APTT PPP: 34 SEC (ref 24.7–36)
AST SERPL-CCNC: 10 U/L (ref 12–45)
AST SERPL-CCNC: 14 U/L (ref 12–45)
AST SERPL-CCNC: 9 U/L (ref 12–45)
BACTERIA #/AREA URNS HPF: ABNORMAL /HPF
BACTERIA UR CULT: ABNORMAL
BACTERIA UR CULT: ABNORMAL
BACTERIA UR CULT: NORMAL
BASOPHILS # BLD AUTO: 0.8 % (ref 0–1.8)
BASOPHILS # BLD AUTO: 0.9 % (ref 0–1.8)
BASOPHILS # BLD AUTO: 1.2 % (ref 0–1.8)
BASOPHILS # BLD: 0.06 K/UL (ref 0–0.12)
BASOPHILS # BLD: 0.06 K/UL (ref 0–0.12)
BASOPHILS # BLD: 0.07 K/UL (ref 0–0.12)
BILIRUB SERPL-MCNC: 0.4 MG/DL (ref 0.1–1.5)
BILIRUB SERPL-MCNC: 0.6 MG/DL (ref 0.1–1.5)
BILIRUB SERPL-MCNC: 0.6 MG/DL (ref 0.1–1.5)
BILIRUB UR QL STRIP.AUTO: NEGATIVE
BILIRUB UR QL STRIP.AUTO: NEGATIVE
BLD GP AB SCN SERPL QL: NORMAL
BUN SERPL-MCNC: 17 MG/DL (ref 8–22)
BUN SERPL-MCNC: 24 MG/DL (ref 8–22)
BUN SERPL-MCNC: 24 MG/DL (ref 8–22)
CALCIUM SERPL-MCNC: 9.4 MG/DL (ref 8.5–10.5)
CALCIUM SERPL-MCNC: 9.6 MG/DL (ref 8.5–10.5)
CALCIUM SERPL-MCNC: 9.7 MG/DL (ref 8.5–10.5)
CAOX CRY #/AREA URNS HPF: ABNORMAL /HPF
CHLORIDE SERPL-SCNC: 105 MMOL/L (ref 96–112)
CHLORIDE SERPL-SCNC: 107 MMOL/L (ref 96–112)
CHLORIDE SERPL-SCNC: 108 MMOL/L (ref 96–112)
CHOLEST SERPL-MCNC: 130 MG/DL (ref 100–199)
CHOLEST SERPL-MCNC: 137 MG/DL (ref 100–199)
CO2 SERPL-SCNC: 23 MMOL/L (ref 20–33)
CO2 SERPL-SCNC: 25 MMOL/L (ref 20–33)
CO2 SERPL-SCNC: 26 MMOL/L (ref 20–33)
COLOR UR: YELLOW
COLOR UR: YELLOW
CREAT SERPL-MCNC: 0.56 MG/DL (ref 0.5–1.4)
CREAT SERPL-MCNC: 0.63 MG/DL (ref 0.5–1.4)
CREAT SERPL-MCNC: 0.69 MG/DL (ref 0.5–1.4)
CREAT UR-MCNC: 118.13 MG/DL
EKG IMPRESSION: NORMAL
EOSINOPHIL # BLD AUTO: 0.11 K/UL (ref 0–0.51)
EOSINOPHIL # BLD AUTO: 0.12 K/UL (ref 0–0.51)
EOSINOPHIL # BLD AUTO: 0.26 K/UL (ref 0–0.51)
EOSINOPHIL NFR BLD: 1.5 % (ref 0–6.9)
EOSINOPHIL NFR BLD: 1.8 % (ref 0–6.9)
EOSINOPHIL NFR BLD: 4.5 % (ref 0–6.9)
EPI CELLS #/AREA URNS HPF: ABNORMAL /HPF
ERYTHROCYTE [DISTWIDTH] IN BLOOD BY AUTOMATED COUNT: 46.1 FL (ref 35.9–50)
ERYTHROCYTE [DISTWIDTH] IN BLOOD BY AUTOMATED COUNT: 46.5 FL (ref 35.9–50)
ERYTHROCYTE [DISTWIDTH] IN BLOOD BY AUTOMATED COUNT: 46.8 FL (ref 35.9–50)
EST. AVERAGE GLUCOSE BLD GHB EST-MCNC: 114 MG/DL
FASTING STATUS PATIENT QL REPORTED: NORMAL
FASTING STATUS PATIENT QL REPORTED: NORMAL
GLOBULIN SER CALC-MCNC: 2.9 G/DL (ref 1.9–3.5)
GLOBULIN SER CALC-MCNC: 2.9 G/DL (ref 1.9–3.5)
GLOBULIN SER CALC-MCNC: 3.1 G/DL (ref 1.9–3.5)
GLUCOSE SERPL-MCNC: 121 MG/DL (ref 65–99)
GLUCOSE SERPL-MCNC: 95 MG/DL (ref 65–99)
GLUCOSE SERPL-MCNC: 96 MG/DL (ref 65–99)
GLUCOSE UR STRIP.AUTO-MCNC: NEGATIVE MG/DL
GLUCOSE UR STRIP.AUTO-MCNC: NEGATIVE MG/DL
HBA1C MFR BLD: 5.6 % (ref 4–5.6)
HCT VFR BLD AUTO: 40.9 % (ref 37–47)
HCT VFR BLD AUTO: 41.4 % (ref 37–47)
HCT VFR BLD AUTO: 42.8 % (ref 37–47)
HDLC SERPL-MCNC: 40 MG/DL
HDLC SERPL-MCNC: 42 MG/DL
HGB BLD-MCNC: 13.3 G/DL (ref 12–16)
HGB BLD-MCNC: 13.3 G/DL (ref 12–16)
HGB BLD-MCNC: 13.6 G/DL (ref 12–16)
HYALINE CASTS #/AREA URNS LPF: ABNORMAL /LPF
IMM GRANULOCYTES # BLD AUTO: 0.01 K/UL (ref 0–0.11)
IMM GRANULOCYTES # BLD AUTO: 0.02 K/UL (ref 0–0.11)
IMM GRANULOCYTES # BLD AUTO: 0.02 K/UL (ref 0–0.11)
IMM GRANULOCYTES NFR BLD AUTO: 0.2 % (ref 0–0.9)
IMM GRANULOCYTES NFR BLD AUTO: 0.3 % (ref 0–0.9)
IMM GRANULOCYTES NFR BLD AUTO: 0.3 % (ref 0–0.9)
INR PPP: 1.07 (ref 0.87–1.13)
KETONES UR STRIP.AUTO-MCNC: NEGATIVE MG/DL
KETONES UR STRIP.AUTO-MCNC: NEGATIVE MG/DL
LDLC SERPL CALC-MCNC: 70 MG/DL
LDLC SERPL CALC-MCNC: 84 MG/DL
LEUKOCYTE ESTERASE UR QL STRIP.AUTO: ABNORMAL
LEUKOCYTE ESTERASE UR QL STRIP.AUTO: NEGATIVE
LYMPHOCYTES # BLD AUTO: 1.82 K/UL (ref 1–4.8)
LYMPHOCYTES # BLD AUTO: 2.13 K/UL (ref 1–4.8)
LYMPHOCYTES # BLD AUTO: 2.39 K/UL (ref 1–4.8)
LYMPHOCYTES NFR BLD: 27.7 % (ref 22–41)
LYMPHOCYTES NFR BLD: 31.5 % (ref 22–41)
LYMPHOCYTES NFR BLD: 36.7 % (ref 22–41)
MCH RBC QN AUTO: 30 PG (ref 27–33)
MCH RBC QN AUTO: 30.2 PG (ref 27–33)
MCH RBC QN AUTO: 30.4 PG (ref 27–33)
MCHC RBC AUTO-ENTMCNC: 31.8 G/DL (ref 33.6–35)
MCHC RBC AUTO-ENTMCNC: 32.1 G/DL (ref 33.6–35)
MCHC RBC AUTO-ENTMCNC: 32.5 G/DL (ref 33.6–35)
MCV RBC AUTO: 93.6 FL (ref 81.4–97.8)
MCV RBC AUTO: 94.1 FL (ref 81.4–97.8)
MCV RBC AUTO: 94.3 FL (ref 81.4–97.8)
MICRO URNS: ABNORMAL
MICRO URNS: NORMAL
MICROALBUMIN UR-MCNC: 1.6 MG/DL
MICROALBUMIN/CREAT UR: 14 MG/G (ref 0–30)
MONOCYTES # BLD AUTO: 0.57 K/UL (ref 0–0.85)
MONOCYTES # BLD AUTO: 0.69 K/UL (ref 0–0.85)
MONOCYTES # BLD AUTO: 0.8 K/UL (ref 0–0.85)
MONOCYTES NFR BLD AUTO: 10.5 % (ref 0–13.4)
MONOCYTES NFR BLD AUTO: 10.6 % (ref 0–13.4)
MONOCYTES NFR BLD AUTO: 9.8 % (ref 0–13.4)
NEUTROPHILS # BLD AUTO: 2.76 K/UL (ref 2–7.15)
NEUTROPHILS # BLD AUTO: 3.85 K/UL (ref 2–7.15)
NEUTROPHILS # BLD AUTO: 4.2 K/UL (ref 2–7.15)
NEUTROPHILS NFR BLD: 47.6 % (ref 44–72)
NEUTROPHILS NFR BLD: 55.3 % (ref 44–72)
NEUTROPHILS NFR BLD: 58.8 % (ref 44–72)
NITRITE UR QL STRIP.AUTO: NEGATIVE
NITRITE UR QL STRIP.AUTO: POSITIVE
NRBC # BLD AUTO: 0 K/UL
NRBC BLD-RTO: 0 /100 WBC
PH UR STRIP.AUTO: 5.5 [PH] (ref 5–8)
PH UR STRIP.AUTO: 6 [PH] (ref 5–8)
PLATELET # BLD AUTO: 184 K/UL (ref 164–446)
PLATELET # BLD AUTO: 192 K/UL (ref 164–446)
PLATELET # BLD AUTO: 228 K/UL (ref 164–446)
PMV BLD AUTO: 10 FL (ref 9–12.9)
PMV BLD AUTO: 10.2 FL (ref 9–12.9)
PMV BLD AUTO: 10.9 FL (ref 9–12.9)
POTASSIUM SERPL-SCNC: 4 MMOL/L (ref 3.6–5.5)
POTASSIUM SERPL-SCNC: 4.1 MMOL/L (ref 3.6–5.5)
POTASSIUM SERPL-SCNC: 4.1 MMOL/L (ref 3.6–5.5)
PROT SERPL-MCNC: 7 G/DL (ref 6–8.2)
PROT SERPL-MCNC: 7.3 G/DL (ref 6–8.2)
PROT SERPL-MCNC: 7.3 G/DL (ref 6–8.2)
PROT UR QL STRIP: NEGATIVE MG/DL
PROT UR QL STRIP: NEGATIVE MG/DL
PROTHROMBIN TIME: 13.6 SEC (ref 12–14.6)
RBC # BLD AUTO: 4.37 M/UL (ref 4.2–5.4)
RBC # BLD AUTO: 4.4 M/UL (ref 4.2–5.4)
RBC # BLD AUTO: 4.54 M/UL (ref 4.2–5.4)
RBC # URNS HPF: ABNORMAL /HPF
RBC UR QL AUTO: NEGATIVE
RBC UR QL AUTO: NEGATIVE
RH BLD: NORMAL
SIGNIFICANT IND 70042: ABNORMAL
SIGNIFICANT IND 70042: NORMAL
SITE SITE: ABNORMAL
SITE SITE: NORMAL
SODIUM SERPL-SCNC: 140 MMOL/L (ref 135–145)
SODIUM SERPL-SCNC: 141 MMOL/L (ref 135–145)
SODIUM SERPL-SCNC: 141 MMOL/L (ref 135–145)
SOURCE SOURCE: ABNORMAL
SOURCE SOURCE: NORMAL
SP GR UR STRIP.AUTO: 1.02
SP GR UR STRIP.AUTO: >=1.03
TRIGL SERPL-MCNC: 63 MG/DL (ref 0–149)
TRIGL SERPL-MCNC: 92 MG/DL (ref 0–149)
TROPONIN T SERPL-MCNC: 11 NG/L (ref 6–19)
UROBILINOGEN UR STRIP.AUTO-MCNC: 0.2 MG/DL
UROBILINOGEN UR STRIP.AUTO-MCNC: 0.2 MG/DL
WBC # BLD AUTO: 5.8 K/UL (ref 4.8–10.8)
WBC # BLD AUTO: 6.6 K/UL (ref 4.8–10.8)
WBC # BLD AUTO: 7.6 K/UL (ref 4.8–10.8)
WBC #/AREA URNS HPF: ABNORMAL /HPF

## 2021-01-01 PROCEDURE — 97110 THERAPEUTIC EXERCISES: CPT

## 2021-01-01 PROCEDURE — 95816 EEG AWAKE AND DROWSY: CPT | Mod: 26 | Performed by: PSYCHIATRY & NEUROLOGY

## 2021-01-01 PROCEDURE — 90662 IIV NO PRSV INCREASED AG IM: CPT | Performed by: FAMILY MEDICINE

## 2021-01-01 PROCEDURE — 91301 MODERNA SARS-COV-2 VACCINE: CPT

## 2021-01-01 PROCEDURE — 0011A MODERNA SARS-COV-2 VACCINE: CPT

## 2021-01-01 PROCEDURE — 81001 URINALYSIS AUTO W/SCOPE: CPT

## 2021-01-01 PROCEDURE — 97112 NEUROMUSCULAR REEDUCATION: CPT

## 2021-01-01 PROCEDURE — 85730 THROMBOPLASTIN TIME PARTIAL: CPT

## 2021-01-01 PROCEDURE — G0008 ADMIN INFLUENZA VIRUS VAC: HCPCS | Performed by: FAMILY MEDICINE

## 2021-01-01 PROCEDURE — 87077 CULTURE AEROBIC IDENTIFY: CPT

## 2021-01-01 PROCEDURE — 96375 TX/PRO/DX INJ NEW DRUG ADDON: CPT | Mod: XU

## 2021-01-01 PROCEDURE — 85025 COMPLETE CBC W/AUTO DIFF WBC: CPT

## 2021-01-01 PROCEDURE — 70498 CT ANGIOGRAPHY NECK: CPT | Mod: MG

## 2021-01-01 PROCEDURE — 97014 ELECTRIC STIMULATION THERAPY: CPT

## 2021-01-01 PROCEDURE — 81003 URINALYSIS AUTO W/O SCOPE: CPT

## 2021-01-01 PROCEDURE — 87086 URINE CULTURE/COLONY COUNT: CPT

## 2021-01-01 PROCEDURE — 96375 TX/PRO/DX INJ NEW DRUG ADDON: CPT

## 2021-01-01 PROCEDURE — 700111 HCHG RX REV CODE 636 W/ 250 OVERRIDE (IP): Performed by: HOSPITALIST

## 2021-01-01 PROCEDURE — 85610 PROTHROMBIN TIME: CPT

## 2021-01-01 PROCEDURE — 70496 CT ANGIOGRAPHY HEAD: CPT | Mod: MG

## 2021-01-01 PROCEDURE — 700111 HCHG RX REV CODE 636 W/ 250 OVERRIDE (IP): Performed by: STUDENT IN AN ORGANIZED HEALTH CARE EDUCATION/TRAINING PROGRAM

## 2021-01-01 PROCEDURE — 87186 SC STD MICRODIL/AGAR DIL: CPT

## 2021-01-01 PROCEDURE — 82043 UR ALBUMIN QUANTITATIVE: CPT

## 2021-01-01 PROCEDURE — 36415 COLL VENOUS BLD VENIPUNCTURE: CPT

## 2021-01-01 PROCEDURE — 80053 COMPREHEN METABOLIC PANEL: CPT

## 2021-01-01 PROCEDURE — 0042T CT-CEREBRAL PERFUSION ANALYSIS: CPT

## 2021-01-01 PROCEDURE — 71045 X-RAY EXAM CHEST 1 VIEW: CPT

## 2021-01-01 PROCEDURE — 83036 HEMOGLOBIN GLYCOSYLATED A1C: CPT | Mod: GA

## 2021-01-01 PROCEDURE — 69210 REMOVE IMPACTED EAR WAX UNI: CPT | Performed by: FAMILY MEDICINE

## 2021-01-01 PROCEDURE — 86850 RBC ANTIBODY SCREEN: CPT

## 2021-01-01 PROCEDURE — 700101 HCHG RX REV CODE 250: Performed by: HOSPITALIST

## 2021-01-01 PROCEDURE — G1004 CDSM NDSC: HCPCS

## 2021-01-01 PROCEDURE — 94760 N-INVAS EAR/PLS OXIMETRY 1: CPT

## 2021-01-01 PROCEDURE — A9270 NON-COVERED ITEM OR SERVICE: HCPCS | Performed by: STUDENT IN AN ORGANIZED HEALTH CARE EDUCATION/TRAINING PROGRAM

## 2021-01-01 PROCEDURE — 700102 HCHG RX REV CODE 250 W/ 637 OVERRIDE(OP): Performed by: STUDENT IN AN ORGANIZED HEALTH CARE EDUCATION/TRAINING PROGRAM

## 2021-01-01 PROCEDURE — 97162 PT EVAL MOD COMPLEX 30 MIN: CPT

## 2021-01-01 PROCEDURE — 99213 OFFICE O/P EST LOW 20 MIN: CPT | Performed by: FAMILY MEDICINE

## 2021-01-01 PROCEDURE — 97140 MANUAL THERAPY 1/> REGIONS: CPT

## 2021-01-01 PROCEDURE — 0012A MODERNA SARS-COV-2 VACCINE: CPT

## 2021-01-01 PROCEDURE — 96376 TX/PRO/DX INJ SAME DRUG ADON: CPT | Mod: XU

## 2021-01-01 PROCEDURE — 96376 TX/PRO/DX INJ SAME DRUG ADON: CPT

## 2021-01-01 PROCEDURE — 95816 EEG AWAKE AND DROWSY: CPT | Performed by: PSYCHIATRY & NEUROLOGY

## 2021-01-01 PROCEDURE — 99211 OFF/OP EST MAY X REQ PHY/QHP: CPT | Performed by: PSYCHIATRY & NEUROLOGY

## 2021-01-01 PROCEDURE — 93005 ELECTROCARDIOGRAM TRACING: CPT | Performed by: EMERGENCY MEDICINE

## 2021-01-01 PROCEDURE — 86901 BLOOD TYPING SEROLOGIC RH(D): CPT

## 2021-01-01 PROCEDURE — 80061 LIPID PANEL: CPT

## 2021-01-01 PROCEDURE — 99213 OFFICE O/P EST LOW 20 MIN: CPT | Performed by: PSYCHIATRY & NEUROLOGY

## 2021-01-01 PROCEDURE — G0378 HOSPITAL OBSERVATION PER HR: HCPCS

## 2021-01-01 PROCEDURE — 86900 BLOOD TYPING SEROLOGIC ABO: CPT

## 2021-01-01 PROCEDURE — 96374 THER/PROPH/DIAG INJ IV PUSH: CPT | Mod: XU

## 2021-01-01 PROCEDURE — 82570 ASSAY OF URINE CREATININE: CPT

## 2021-01-01 PROCEDURE — 700117 HCHG RX CONTRAST REV CODE 255: Performed by: EMERGENCY MEDICINE

## 2021-01-01 PROCEDURE — 99285 EMERGENCY DEPT VISIT HI MDM: CPT

## 2021-01-01 PROCEDURE — 700105 HCHG RX REV CODE 258: Performed by: EMERGENCY MEDICINE

## 2021-01-01 PROCEDURE — 700111 HCHG RX REV CODE 636 W/ 250 OVERRIDE (IP): Performed by: EMERGENCY MEDICINE

## 2021-01-01 PROCEDURE — 99220 PR INITIAL OBSERVATION CARE,LEVL III: CPT | Performed by: HOSPITALIST

## 2021-01-01 PROCEDURE — G0439 PPPS, SUBSEQ VISIT: HCPCS | Performed by: FAMILY MEDICINE

## 2021-01-01 PROCEDURE — 84484 ASSAY OF TROPONIN QUANT: CPT

## 2021-01-01 RX ORDER — LORAZEPAM 2 MG/ML
1 CONCENTRATE ORAL
Status: DISCONTINUED | OUTPATIENT
Start: 2021-01-01 | End: 2021-01-01

## 2021-01-01 RX ORDER — POTASSIUM CHLORIDE 20 MEQ/1
20 TABLET, EXTENDED RELEASE ORAL
Qty: 90 TABLET | Refills: 0 | Status: SHIPPED | OUTPATIENT
Start: 2021-01-01 | End: 2021-01-01

## 2021-01-01 RX ORDER — MORPHINE SULFATE 10 MG/ML
5 INJECTION, SOLUTION INTRAMUSCULAR; INTRAVENOUS
Status: DISCONTINUED | OUTPATIENT
Start: 2021-01-01 | End: 2021-01-01 | Stop reason: HOSPADM

## 2021-01-01 RX ORDER — SCOLOPAMINE TRANSDERMAL SYSTEM 1 MG/1
1 PATCH, EXTENDED RELEASE TRANSDERMAL
Status: DISCONTINUED | OUTPATIENT
Start: 2021-01-01 | End: 2021-01-01 | Stop reason: HOSPADM

## 2021-01-01 RX ORDER — LORAZEPAM 2 MG/ML
4 INJECTION INTRAMUSCULAR
Status: DISCONTINUED | OUTPATIENT
Start: 2021-01-01 | End: 2021-01-01 | Stop reason: HOSPADM

## 2021-01-01 RX ORDER — PREDNISONE 20 MG/1
20 TABLET ORAL DAILY
Qty: 10 TAB | Refills: 0 | Status: SHIPPED | OUTPATIENT
Start: 2021-01-01 | End: 2021-01-01

## 2021-01-01 RX ORDER — SODIUM CHLORIDE 9 MG/ML
500 INJECTION, SOLUTION INTRAVENOUS ONCE
Status: COMPLETED | OUTPATIENT
Start: 2021-01-01 | End: 2021-01-01

## 2021-01-01 RX ORDER — LORAZEPAM 2 MG/ML
1 INJECTION INTRAMUSCULAR
Status: DISCONTINUED | OUTPATIENT
Start: 2021-01-01 | End: 2021-01-01

## 2021-01-01 RX ORDER — SULFAMETHOXAZOLE AND TRIMETHOPRIM 800; 160 MG/1; MG/1
1 TABLET ORAL 2 TIMES DAILY
Qty: 6 TAB | Refills: 0 | Status: SHIPPED | OUTPATIENT
Start: 2021-01-01 | End: 2021-01-01

## 2021-01-01 RX ORDER — HYDROMORPHONE HYDROCHLORIDE 2 MG/ML
3 INJECTION, SOLUTION INTRAMUSCULAR; INTRAVENOUS; SUBCUTANEOUS
Status: DISCONTINUED | OUTPATIENT
Start: 2021-01-01 | End: 2021-01-01 | Stop reason: HOSPADM

## 2021-01-01 RX ORDER — HYDROMORPHONE HYDROCHLORIDE 1 MG/ML
1 INJECTION, SOLUTION INTRAMUSCULAR; INTRAVENOUS; SUBCUTANEOUS
Status: DISCONTINUED | OUTPATIENT
Start: 2021-01-01 | End: 2021-01-01

## 2021-01-01 RX ORDER — ATROPINE SULFATE 10 MG/ML
2 SOLUTION/ DROPS OPHTHALMIC EVERY 4 HOURS PRN
Status: DISCONTINUED | OUTPATIENT
Start: 2021-01-01 | End: 2021-01-01 | Stop reason: HOSPADM

## 2021-01-01 RX ORDER — FUROSEMIDE 20 MG/1
20 TABLET ORAL
Qty: 90 TABLET | Refills: 0 | Status: SHIPPED | OUTPATIENT
Start: 2021-01-01 | End: 2021-01-01

## 2021-01-01 RX ORDER — LACOSAMIDE 50 MG/1
50 TABLET ORAL 2 TIMES DAILY
Qty: 180 TAB | Refills: 3 | Status: SHIPPED | OUTPATIENT
Start: 2021-01-01 | End: 2021-01-01

## 2021-01-01 RX ORDER — LORAZEPAM 2 MG/ML
4 INJECTION INTRAMUSCULAR
Status: DISCONTINUED | OUTPATIENT
Start: 2021-01-01 | End: 2021-01-01

## 2021-01-01 RX ORDER — MORPHINE SULFATE 10 MG/ML
10 INJECTION, SOLUTION INTRAMUSCULAR; INTRAVENOUS
Status: DISCONTINUED | OUTPATIENT
Start: 2021-01-01 | End: 2021-01-01

## 2021-01-01 RX ORDER — HYDRALAZINE HYDROCHLORIDE 20 MG/ML
20 INJECTION INTRAMUSCULAR; INTRAVENOUS EVERY 6 HOURS PRN
Status: DISCONTINUED | OUTPATIENT
Start: 2021-01-01 | End: 2021-01-01 | Stop reason: HOSPADM

## 2021-01-01 RX ORDER — ONDANSETRON 2 MG/ML
8 INJECTION INTRAMUSCULAR; INTRAVENOUS EVERY 8 HOURS PRN
Status: DISCONTINUED | OUTPATIENT
Start: 2021-01-01 | End: 2021-01-01 | Stop reason: HOSPADM

## 2021-01-01 RX ORDER — LORAZEPAM 2 MG/ML
1 CONCENTRATE ORAL
Status: DISCONTINUED | OUTPATIENT
Start: 2021-01-01 | End: 2021-01-01 | Stop reason: HOSPADM

## 2021-01-01 RX ORDER — MORPHINE SULFATE 10 MG/ML
10 INJECTION, SOLUTION INTRAMUSCULAR; INTRAVENOUS
Status: DISCONTINUED | OUTPATIENT
Start: 2021-01-01 | End: 2021-01-01 | Stop reason: HOSPADM

## 2021-01-01 RX ORDER — HYDROMORPHONE HYDROCHLORIDE 4 MG/1
4 TABLET ORAL
Status: DISCONTINUED | OUTPATIENT
Start: 2021-01-01 | End: 2021-01-01

## 2021-01-01 RX ORDER — POLYVINYL ALCOHOL 14 MG/ML
2 SOLUTION/ DROPS OPHTHALMIC EVERY 6 HOURS PRN
Status: DISCONTINUED | OUTPATIENT
Start: 2021-01-01 | End: 2021-01-01 | Stop reason: HOSPADM

## 2021-01-01 RX ORDER — ONDANSETRON 4 MG/1
8 TABLET, ORALLY DISINTEGRATING ORAL EVERY 8 HOURS PRN
Status: DISCONTINUED | OUTPATIENT
Start: 2021-01-01 | End: 2021-01-01 | Stop reason: HOSPADM

## 2021-01-01 RX ADMIN — MORPHINE SULFATE 10 MG: 10 INJECTION INTRAVENOUS at 20:08

## 2021-01-01 RX ADMIN — MORPHINE SULFATE 10 MG: 10 INJECTION INTRAVENOUS at 02:45

## 2021-01-01 RX ADMIN — IOHEXOL 40 ML: 350 INJECTION, SOLUTION INTRAVENOUS at 06:15

## 2021-01-01 RX ADMIN — LORAZEPAM 4 MG: 2 INJECTION INTRAMUSCULAR; INTRAVENOUS at 03:47

## 2021-01-01 RX ADMIN — HYDROMORPHONE HYDROCHLORIDE 3 MG: 2 INJECTION, SOLUTION INTRAMUSCULAR; INTRAVENOUS; SUBCUTANEOUS at 06:09

## 2021-01-01 RX ADMIN — MORPHINE SULFATE 5 MG: 10 INJECTION INTRAVENOUS at 14:19

## 2021-01-01 RX ADMIN — MORPHINE SULFATE 5 MG: 10 INJECTION INTRAVENOUS at 21:47

## 2021-01-01 RX ADMIN — LORAZEPAM 4 MG: 2 INJECTION INTRAMUSCULAR; INTRAVENOUS at 00:09

## 2021-01-01 RX ADMIN — MORPHINE SULFATE 10 MG: 10 INJECTION INTRAVENOUS at 15:50

## 2021-01-01 RX ADMIN — MORPHINE SULFATE 10 MG: 10 INJECTION INTRAVENOUS at 03:47

## 2021-01-01 RX ADMIN — LORAZEPAM 4 MG: 2 INJECTION INTRAMUSCULAR; INTRAVENOUS at 02:45

## 2021-01-01 RX ADMIN — MORPHINE SULFATE 10 MG: 10 INJECTION INTRAVENOUS at 06:09

## 2021-01-01 RX ADMIN — HYDROMORPHONE HYDROCHLORIDE 3 MG: 2 INJECTION, SOLUTION INTRAMUSCULAR; INTRAVENOUS; SUBCUTANEOUS at 08:23

## 2021-01-01 RX ADMIN — MORPHINE SULFATE 10 MG: 10 INJECTION INTRAVENOUS at 01:29

## 2021-01-01 RX ADMIN — MORPHINE SULFATE 10 MG: 10 INJECTION INTRAVENOUS at 21:41

## 2021-01-01 RX ADMIN — LORAZEPAM 4 MG: 2 INJECTION INTRAMUSCULAR; INTRAVENOUS at 20:40

## 2021-01-01 RX ADMIN — MORPHINE SULFATE 10 MG: 10 INJECTION INTRAVENOUS at 12:41

## 2021-01-01 RX ADMIN — MORPHINE SULFATE 10 MG: 10 INJECTION INTRAVENOUS at 22:14

## 2021-01-01 RX ADMIN — MORPHINE SULFATE 10 MG: 10 INJECTION INTRAVENOUS at 20:41

## 2021-01-01 RX ADMIN — HYDROMORPHONE HYDROCHLORIDE 1 MG: 1 INJECTION, SOLUTION INTRAMUSCULAR; INTRAVENOUS; SUBCUTANEOUS at 03:47

## 2021-01-01 RX ADMIN — SODIUM CHLORIDE 500 ML: 9 INJECTION, SOLUTION INTRAVENOUS at 06:33

## 2021-01-01 RX ADMIN — MORPHINE SULFATE 10 MG: 10 INJECTION INTRAVENOUS at 14:24

## 2021-01-01 RX ADMIN — IOHEXOL 80 ML: 350 INJECTION, SOLUTION INTRAVENOUS at 06:15

## 2021-01-01 RX ADMIN — MORPHINE SULFATE 10 MG: 10 INJECTION INTRAVENOUS at 10:39

## 2021-01-01 RX ADMIN — HYDROMORPHONE HYDROCHLORIDE 1 MG: 1 INJECTION, SOLUTION INTRAMUSCULAR; INTRAVENOUS; SUBCUTANEOUS at 05:01

## 2021-01-01 RX ADMIN — HYDROMORPHONE HYDROCHLORIDE 3 MG: 2 INJECTION, SOLUTION INTRAMUSCULAR; INTRAVENOUS; SUBCUTANEOUS at 06:52

## 2021-01-01 RX ADMIN — LORAZEPAM 4 MG: 2 INJECTION INTRAMUSCULAR; INTRAVENOUS at 05:01

## 2021-01-01 RX ADMIN — MORPHINE SULFATE 5 MG: 10 INJECTION INTRAVENOUS at 03:17

## 2021-01-01 RX ADMIN — HYDROMORPHONE HYDROCHLORIDE 1 MG: 1 INJECTION, SOLUTION INTRAMUSCULAR; INTRAVENOUS; SUBCUTANEOUS at 03:11

## 2021-01-01 RX ADMIN — LORAZEPAM 4 MG: 2 INJECTION INTRAMUSCULAR; INTRAVENOUS at 01:04

## 2021-01-01 RX ADMIN — MORPHINE SULFATE 10 MG: 10 INJECTION INTRAVENOUS at 01:03

## 2021-01-01 RX ADMIN — MORPHINE SULFATE 10 MG: 10 INJECTION INTRAVENOUS at 19:37

## 2021-01-01 RX ADMIN — MORPHINE SULFATE 5 MG: 10 INJECTION INTRAVENOUS at 11:34

## 2021-01-01 RX ADMIN — LORAZEPAM 4 MG: 2 INJECTION INTRAMUSCULAR; INTRAVENOUS at 19:37

## 2021-01-01 RX ADMIN — HYDRALAZINE HYDROCHLORIDE 20 MG: 20 INJECTION INTRAMUSCULAR; INTRAVENOUS at 05:48

## 2021-01-01 RX ADMIN — LORAZEPAM 4 MG: 2 INJECTION INTRAMUSCULAR; INTRAVENOUS at 22:48

## 2021-01-01 RX ADMIN — ATROPINE SULFATE 2 DROP: 10 SOLUTION OPHTHALMIC at 06:12

## 2021-01-01 RX ADMIN — MORPHINE SULFATE 10 MG: 10 INJECTION INTRAVENOUS at 00:09

## 2021-01-01 RX ADMIN — HYDROMORPHONE HYDROCHLORIDE 3 MG: 2 INJECTION, SOLUTION INTRAMUSCULAR; INTRAVENOUS; SUBCUTANEOUS at 05:20

## 2021-01-01 RX ADMIN — MORPHINE SULFATE 5 MG: 10 INJECTION INTRAVENOUS at 18:19

## 2021-01-01 RX ADMIN — MORPHINE SULFATE 10 MG: 10 INJECTION INTRAVENOUS at 22:49

## 2021-01-01 RX ADMIN — MORPHINE SULFATE 5 MG: 10 INJECTION INTRAVENOUS at 06:34

## 2021-01-01 RX ADMIN — SCOPALAMINE 1 PATCH: 1 PATCH, EXTENDED RELEASE TRANSDERMAL at 20:08

## 2021-01-01 RX ADMIN — MORPHINE SULFATE 10 MG: 10 INJECTION INTRAVENOUS at 03:24

## 2021-01-01 RX ADMIN — ONDANSETRON 4 MG: 2 INJECTION INTRAMUSCULAR; INTRAVENOUS at 06:08

## 2021-01-01 RX ADMIN — LORAZEPAM 2 MG: 2 INJECTION INTRAMUSCULAR; INTRAVENOUS at 08:23

## 2021-01-01 RX ADMIN — HYDROMORPHONE HYDROCHLORIDE 1 MG: 1 INJECTION, SOLUTION INTRAMUSCULAR; INTRAVENOUS; SUBCUTANEOUS at 04:33

## 2021-01-01 RX ADMIN — MORPHINE SULFATE 10 MG: 10 INJECTION INTRAVENOUS at 23:19

## 2021-01-01 RX ADMIN — FENTANYL CITRATE 100 MCG: 50 INJECTION, SOLUTION INTRAMUSCULAR; INTRAVENOUS at 05:41

## 2021-01-01 RX ADMIN — MORPHINE SULFATE 10 MG: 10 INJECTION INTRAVENOUS at 21:09

## 2021-01-01 RX ADMIN — ATROPINE SULFATE 2 DROP: 10 SOLUTION OPHTHALMIC at 12:46

## 2021-01-01 RX ADMIN — MORPHINE SULFATE 10 MG: 10 INJECTION INTRAVENOUS at 04:33

## 2021-01-01 RX ADMIN — MORPHINE SULFATE 10 MG: 10 INJECTION INTRAVENOUS at 18:29

## 2021-01-01 RX ADMIN — LORAZEPAM 4 MG: 2 INJECTION INTRAMUSCULAR; INTRAVENOUS at 21:41

## 2021-01-01 RX ADMIN — MORPHINE SULFATE 10 MG: 10 INJECTION INTRAVENOUS at 04:09

## 2021-01-01 RX ADMIN — MORPHINE SULFATE 10 MG: 10 INJECTION INTRAVENOUS at 01:46

## 2021-01-01 RX ADMIN — MORPHINE SULFATE 10 MG: 10 INJECTION INTRAVENOUS at 00:41

## 2021-01-01 RX ADMIN — LORAZEPAM 1 MG: 2 INJECTION INTRAMUSCULAR; INTRAVENOUS at 11:02

## 2021-01-01 SDOH — HEALTH STABILITY: MENTAL HEALTH
STRESS IS WHEN SOMEONE FEELS TENSE, NERVOUS, ANXIOUS, OR CAN'T SLEEP AT NIGHT BECAUSE THEIR MIND IS TROUBLED. HOW STRESSED ARE YOU?: TO SOME EXTENT

## 2021-01-01 SDOH — ECONOMIC STABILITY: INCOME INSECURITY: IN THE LAST 12 MONTHS, WAS THERE A TIME WHEN YOU WERE NOT ABLE TO PAY THE MORTGAGE OR RENT ON TIME?: NO

## 2021-01-01 SDOH — ECONOMIC STABILITY: HOUSING INSECURITY
IN THE LAST 12 MONTHS, WAS THERE A TIME WHEN YOU DID NOT HAVE A STEADY PLACE TO SLEEP OR SLEPT IN A SHELTER (INCLUDING NOW)?: NO

## 2021-01-01 SDOH — ECONOMIC STABILITY: TRANSPORTATION INSECURITY
IN THE PAST 12 MONTHS, HAS THE LACK OF TRANSPORTATION KEPT YOU FROM MEDICAL APPOINTMENTS OR FROM GETTING MEDICATIONS?: NO

## 2021-01-01 SDOH — HEALTH STABILITY: PHYSICAL HEALTH: ON AVERAGE, HOW MANY DAYS PER WEEK DO YOU ENGAGE IN MODERATE TO STRENUOUS EXERCISE (LIKE A BRISK WALK)?: 0 DAYS

## 2021-01-01 SDOH — ECONOMIC STABILITY: HOUSING INSECURITY

## 2021-01-01 SDOH — HEALTH STABILITY: PHYSICAL HEALTH: ON AVERAGE, HOW MANY MINUTES DO YOU ENGAGE IN EXERCISE AT THIS LEVEL?: 0 MINUTES

## 2021-01-01 SDOH — ECONOMIC STABILITY: TRANSPORTATION INSECURITY
IN THE PAST 12 MONTHS, HAS LACK OF RELIABLE TRANSPORTATION KEPT YOU FROM MEDICAL APPOINTMENTS, MEETINGS, WORK OR FROM GETTING THINGS NEEDED FOR DAILY LIVING?: NO

## 2021-01-01 ASSESSMENT — BALANCE ASSESSMENTS
STANDING TO SITTING: 3
STANDING ON ONE LEG: 1
PLACE ALTERNATE FOOT ON STEP OR STOOL WHILE STANDING UNSUPPORTED: 1
BALANCE - SITTING STATIC: NORMAL
PLACE ALTERNATE FOOT ON STEP OR STOOL WHILE STANDING UNSUPPORTED: 1
STANDING ON ONE LEG: 1
LONG VERSION TOTAL SCORE (MAX 56): 42
REACHING FORWARD WITH OUTSTRETCHED ARM WHILE STANDING: 3
STANDING UNSUPPORTED WITH FEET TOGETHER: 3
STANDING UNSUPPORTED WITH EYES CLOSED: 3
LOOK OVER LEFT AND RIGHT SHOULDERS WHILE STANDING: 3
LONG VERSION TOTAL SCORE (MAX 56): 38
SITTING TO STANDING: 4
STANDING UNSUPPORTED WITH FEET TOGETHER: 2
REACHING FORWARD WITH OUTSTRETCHED ARM WHILE STANDING: 2
PICK UP OBJECT FROM THE FLOOR FROM A STANDING POSITION: 4
PLACE ALTERNATE FOOT ON STEP OR STOOL WHILE STANDING UNSUPPORTED: 1
PICK UP OBJECT FROM THE FLOOR FROM A STANDING POSITION: 4
SITTING TO STANDING: 4
SITTING UNSUPPORTED: 4
STANDING UNSUPPORTED ONE FOOT IN FRONT: 1
STANDING UNSUPPORTED: 4
SITTING UNSUPPORTED: 4
STANDING UNSUPPORTED ONE FOOT IN FRONT: 1
STANDING UNSUPPORTED WITH EYES CLOSED: 4
LONG VERSION TOTAL SCORE (MAX 56): 42
SITTING UNSUPPORTED: 4
STANDING UNSUPPORTED WITH FEET TOGETHER: 2
STANDING TO SITTING: 4
BALANCE - STANDING STATIC: FAIR -
TRANSFERS: 4
BALANCE - SITTING DYNAMIC: GOOD
STANDING UNSUPPORTED: 4
TURN 360 DEGREES: 2
TRANSFERS: 3
PICK UP OBJECT FROM THE FLOOR FROM A STANDING POSITION: 3
STANDING UNSUPPORTED: 4
BALANCE - STANDING DYNAMIC: POOR +
TURN 360 DEGREES: 2
TURN 360 DEGREES: 3
STANDING UNSUPPORTED WITH EYES CLOSED: 3
REACHING FORWARD WITH OUTSTRETCHED ARM WHILE STANDING: 2
TRANSFERS: 3
LONG VERSION TOTAL SCORE (MAX 56): 38
STANDING ON ONE LEG: 1
SITTING TO STANDING: 4
LOOK OVER LEFT AND RIGHT SHOULDERS WHILE STANDING: 4

## 2021-01-01 ASSESSMENT — SOCIAL DETERMINANTS OF HEALTH (SDOH)
HOW OFTEN DO YOU ATTEND CHURCH OR RELIGIOUS SERVICES?: 1 TO 4 TIMES PER YEAR
WITHIN THE PAST 12 MONTHS, YOU WORRIED THAT YOUR FOOD WOULD RUN OUT BEFORE YOU GOT THE MONEY TO BUY MORE: NEVER TRUE
HOW OFTEN DO YOU HAVE SIX OR MORE DRINKS ON ONE OCCASION: NEVER
IN A TYPICAL WEEK, HOW MANY TIMES DO YOU TALK ON THE PHONE WITH FAMILY, FRIENDS, OR NEIGHBORS?: NEVER
DO YOU BELONG TO ANY CLUBS OR ORGANIZATIONS SUCH AS CHURCH GROUPS UNIONS, FRATERNAL OR ATHLETIC GROUPS, OR SCHOOL GROUPS?: NO
HOW OFTEN DO YOU ATTENT MEETINGS OF THE CLUB OR ORGANIZATION YOU BELONG TO?: NEVER
WITHIN THE PAST 12 MONTHS, THE FOOD YOU BOUGHT JUST DIDN'T LAST AND YOU DIDN'T HAVE MONEY TO GET MORE: NEVER TRUE
HOW OFTEN DO YOU HAVE A DRINK CONTAINING ALCOHOL: NEVER
HOW OFTEN DO YOU GET TOGETHER WITH FRIENDS OR RELATIVES?: ONCE A WEEK
HOW MANY DRINKS CONTAINING ALCOHOL DO YOU HAVE ON A TYPICAL DAY WHEN YOU ARE DRINKING: PATIENT DECLINED
HOW HARD IS IT FOR YOU TO PAY FOR THE VERY BASICS LIKE FOOD, HOUSING, MEDICAL CARE, AND HEATING?: NOT HARD AT ALL

## 2021-01-01 ASSESSMENT — ENCOUNTER SYMPTOMS
PAIN TIMING: ALL DAY
QUALITY: ACHING
PAIN SCALE: 4
FOCAL WEAKNESS: 1
GENERAL WELL-BEING: FAIR

## 2021-01-01 ASSESSMENT — FIBROSIS 4 INDEX
FIB4 SCORE: 2.16
FIB4 SCORE: 2.16
FIB4 SCORE: 1.74
FIB4 SCORE: 1.94
FIB4 SCORE: 1.93

## 2021-01-01 ASSESSMENT — ACTIVITIES OF DAILY LIVING (ADL): BATHING_REQUIRES_ASSISTANCE: 1

## 2021-01-01 ASSESSMENT — PATIENT HEALTH QUESTIONNAIRE - PHQ9
CLINICAL INTERPRETATION OF PHQ2 SCORE: 0
CLINICAL INTERPRETATION OF PHQ2 SCORE: 0

## 2021-01-04 PROBLEM — G40.209 PARTIAL SYMPTOMATIC EPILEPSY WITH COMPLEX PARTIAL SEIZURES, NOT INTRACTABLE, WITHOUT STATUS EPILEPTICUS (HCC): Status: ACTIVE | Noted: 2021-01-01

## 2021-01-25 NOTE — TELEPHONE ENCOUNTER
ESTABLISHED PATIENT PRE-VISIT PLANNING       01/25/21@3:55PM Called patient. Spoke to patient's spouse Raul Camacho, who is listed in patient's contacts under demographics with permissions to discuss both billing and treatment. Advised OV scheduled Tuesday, 1[facility] Offered [facility] Visit-Spouse declined.     Patient was contacted to complete PVP.     Note: Patient will not be contacted if there is no indication to call.     1.  Reviewed notes from the last few office visits within the medical group: Yes    2.  If any orders were placed at last visit or intended to be done for this visit (i.e. 6 mos follow-up), do we have Results/Consult Notes?         •  Labs - Labs ordered, completed on 01/12/21 and results are in chart.  Note: If patient appointment is for lab review and patient did not complete labs, check with provider if OK to reschedule patient until labs completed.       •  Imaging - Imaging ordered, completed and results are in chart.       •  Referrals - Referral ordered, patient has NOT been seen.    3. Is this appointment scheduled as a Hospital Follow-Up? No    4.  Immunizations were updated in Epic using Reconcile Outside Information activity? Yes    5.  Patient is due for the following Health Maintenance Topics:   There are no preventive care reminders to display for this patient.    6.  AHA (Pulse8) form printed for Provider? N/A

## 2021-01-28 NOTE — PROGRESS NOTES
"Subjective:   CC: Erythema nodosum    HPI:     Danielle Camacho is a 86 y.o. 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  She has intermittent flares in the past that responded well to oral prednisone.  Patient presents today with similar symptoms of palpable, tender, mildly erythematous lumps in lower extremities bilaterally.  Her  has been giving her over-the-counter Tylenol/ibuprofen for pain.  However, pain has been getting worse over the past few days interfering with sleep and mood.  Denies fever, chills, recent injury to lower extremities.    Current medicines (including changes today)  Current Outpatient Medications   Medication Sig Dispense Refill   • predniSONE (DELTASONE) 20 MG Tab Take 1 Tab by mouth every day for 10 days. 10 Tab 0   • lacosamide (VIMPAT) 50 MG Tab tablet Take 1 Tab by mouth 2 Times a Day for 360 days. 180 Tab 3   • rosuvastatin (CRESTOR) 20 MG Tab TAKE 1 TABLET EVERY EVENING 90 Tab 3   • calcium carbonate (TUMS) 500 MG Chew Tab Take 500 mg by mouth every day. BID     • Cholecalciferol (VITAMIN D3) 2000 UNIT Cap Take 3,000 Units by mouth.       No current facility-administered medications for this visit.      She  has a past medical history of Brain bleed (HCC) (2008), Dementia (HCC), Seizure (HCC), and Urinary tract infection.    I personally reviewed patient's problem list, allergies, medications, family hx, social hx with patient and update EPIC.     REVIEW OF SYSTEMS:  CONSTITUTIONAL:  Denies night sweats, fatigue, malaise, lethargy, fever or chills.  RESPIRATORY:  Denies cough, wheeze, hemoptysis, or shortness of breath.  CARDIOVASCULAR:  Denies chest pains, palpitations, pedal edema     Objective:     /70 (BP Location: Right arm, Patient Position: Sitting, BP Cuff Size: Adult)   Pulse 68   Temp 36.7 °C (98 °F) (Temporal)   Ht 1.626 m (5' 4\")   Wt " 56.2 kg (124 lb)   SpO2 94%  Body mass index is 21.28 kg/m².    Physical Exam:  Constitutional: awake, alert, in no distress.  Skin: Warm, dry, good turgor, no rashes, bruises, ulcers in visible areas.  - palpable tender, mildly erythematous nodules in soft tissues of LEs bilaterally.   Eye: conjunctiva clear, lids neg for edema or lesions.  Neck: Trachea midline, no masses, no thyromegaly. No cervical or supraclavicular lymphadenopathy  Respiratory: Unlabored respiratory effort, lungs clear to auscultation, no wheezes, no rales.  Cardiovascular: Normal S1, S2, no murmur, no pedal edema.  Psych: Oriented x3, affect and mood wnl, intact judgement and insight.       Assessment and Plan:   The following treatment plan was discussed    1. Erythema nodosum  - predniSONE (DELTASONE) 20 MG Tab; Take 1 Tab by mouth every day for 10 days.  Dispense: 10 Tab; Refill: 0  - OTC analgesics PRN   - f/u PRN.       Lisa Pratt M.D.      Followup: Return for As needed.    Please note that this dictation was created using voice recognition software. I have made every reasonable attempt to correct obvious errors, but I expect that there are errors of grammar and possibly content that I did not discover before finalizing the note.

## 2021-02-10 NOTE — PROCEDURES
VIDEO ELECTROENCEPHALOGRAM REPORT      Referring provider: Dr. Lisa Pratt MD    DOS: February 10, 2021 of 30-minute duration.    INDICATION:  Danielle Camacho 86 y.o. 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  EEG is now requested as a baseline study.  No previous tracings available for comparison.    CURRENT ANTIEPILEPTIC REGIMEN: Vimpat 50 mg, twice daily.    TECHNIQUE: 30 channel video electroencephalogram (EEG) was performed in accordance with the international 10-20 system. The study was reviewed in bipolar and referential montages. The recording examined the patient during wakeful and drowsy/sleep state(s).     DESCRIPTION OF THE RECORD:  During the wakefulness, the background showed an 8 Hz alpha activity posteriorly, slightly better organized and rhythmic over the left when compared to the right, but with symmetric amplitude of 70 mV.  There was reactivity to eye closure/opening.  A normal anterior-posterior gradient was noted with faster beta frequencies seen anteriorly.  During drowsiness, theta/delta frequencies were seen.  Bifrontal eye movement artifact is seen intermittently.  The patient does not achieve sleep for the tracings duration.    ACTIVATION PROCEDURES:   Due to the patient's age, hyperventilation was not performed.      Intermittent Photic stimulation was performed in a stepwise fashion from 1 to 30 Hz and elicited only minimal driving response, most noticeable in the posterior leads.    ICTAL AND/OR INTERICTAL FINDINGS:   No focal or generalized epileptiform activity noted.  Throughout the tracing, minimal slowing is seen over the left mid temporal region consisting of a lower amplitude delta activity.  No paroxysmal qualities are seen.     EKG: sampling of the EKG recording demonstrated sinus rhythm.     EVENTS:      INTERPRETATION:  This is a mildly abnormal EEG for  patient of his age in the awake and drowsy states.  No paroxysmal activity is seen, no clearly identified electroencephalographic seizure activity occurs.  Clinical correlation is recommended.  Findings are consistent with underlying focal abnormality, clinical correlation with imaging as reported.    Note: A normal EEG does not rule out epilepsy.  If the clinical suspicion remains high for seizures, a prolonged recording to capture clinical or subclinical events may be helpful.        Elias Roper M.D.

## 2021-04-14 NOTE — PROGRESS NOTES
ESTABLISHED PATIENT PRE-VISIT PLANNING   Wednesday, 04/14/2021@2:49PM:    Phone Number Called: [phone] (home)   Call outcome: Spoke to Spouse Raul Camacho who is listed in patient's contacts under demographics tab with permissions to discuss treatment and billing  Message: Advised office visit scheduled Friday, 04/16/21@10:00AM, Dr. Pratt, [address]. Patient's spouse request office visit.    Patient was contacted to complete PVP.     Note: Patient will not be contacted if there is no indication to call.     1.  Reviewed notes from the last few office visits within the medical group: Yes    2.  If any orders were placed at last visit or intended to be done for this visit (i.e. 6 mos follow-up), do we have Results/Consult Notes?         •  Labs - Labs ordered, completed on 04/13/2021 and results are in chart.  Note: If patient appointment is for lab review and patient did not complete labs, check with provider if OK to reschedule patient until labs completed.       •  Imaging - Imaging ordered, completed and results are in chart.       •  Referrals - Referral ordered, patient was seen and consult notes are in chart. Care Teams updated  YES.    3. Is this appointment scheduled as a Hospital Follow-Up? No    4.  Immunizations were updated in Epic using Reconcile Outside Information activity? Yes    5.  Patient is due for the following Health Maintenance Topics:   Health Maintenance Due   Topic Date Due   • Annual Wellness Visit  03/20/2021       6.  AHA (Pulse8) form printed for Provider? N/A

## 2021-04-16 PROBLEM — Z91.81 RISK FOR FALLS: Status: ACTIVE | Noted: 2021-01-01

## 2021-04-16 PROBLEM — G40.209 PARTIAL SYMPTOMATIC EPILEPSY WITH COMPLEX PARTIAL SEIZURES, NOT INTRACTABLE, WITHOUT STATUS EPILEPTICUS (HCC): Status: RESOLVED | Noted: 2021-01-01 | Resolved: 2021-01-01

## 2021-04-16 PROBLEM — I83.12 VARICOSE VEINS OF BOTH LOWER EXTREMITIES WITH INFLAMMATION: Status: RESOLVED | Noted: 2020-08-27 | Resolved: 2021-01-01

## 2021-04-16 PROBLEM — I83.11 VARICOSE VEINS OF BOTH LOWER EXTREMITIES WITH INFLAMMATION: Status: RESOLVED | Noted: 2020-08-27 | Resolved: 2021-01-01

## 2021-04-16 NOTE — PROGRESS NOTES
Chief Complaint   Patient presents with   • Medicare Annual Wellness       HPI:  Keiko is a 86 y.o. here for Medicare Annual Wellness Visit    Patient fell couple weeks ago while attempting to walk down the steps.  Her  took her to urgent care in [place].  Left ankle and left knee x-ray were negative for acute fracture.  Patient developed acute left leg edema a few days later.  Her  took her to urgent care in [place] again.  Venous Doppler of the left leg was negative for DVT.  Her  states that left ankle and knee x-rays were also negative for acute fracture.  Today, most of her symptoms are improving including pedal edema of the left leg.  Patient is doing well.    Patient has been taking lacosamide 50 mg twice daily for complex partial seizure.  This condition is currently being managed by neurology.  Last visit with neurology was in March 2020.  Her  is concerned of oversedation with Vimpat.  He has been tapering her off this medication.  He continues to observe for symptoms of seizure.  He had appointment to follow-up with neurology in near future.    Patient Active Problem List    Diagnosis Date Noted   • Risk for falls 04/16/2021   • Erythema nodosum 11/17/2020   • Vascular dementia without behavioral disturbance (HCC) 11/07/2019   • History of subarachnoid hemorrhage 03/18/2019   • Essential hypertension 12/20/2018   • complex partial seizure 12/20/2018   • History of hemorrhagic cerebrovascular accident (CVA) with residual deficit x2 12/20/2018   • Osteoporosis_declined treatment 09/26/2017   • Mixed hyperlipidemia 03/27/2013       Current Outpatient Medications   Medication Sig Dispense Refill   • lacosamide (VIMPAT) 50 MG Tab tablet Take 1 Tab by mouth 2 Times a Day for 360 days. 180 Tab 3   • rosuvastatin (CRESTOR) 20 MG Tab TAKE 1 TABLET EVERY EVENING 90 Tab 3   • calcium carbonate (TUMS) 500 MG Chew Tab Take 500 mg by mouth every day. BID     • Cholecalciferol  (VITAMIN D3) 2000 UNIT Cap Take 3,000 Units by mouth.       No current facility-administered medications for this visit.        Patient is taking medications as noted in medication list.  Current supplements as per medication list.     Allergies: Patient has no known allergies.    Current social contact/activities: pt socializes with family     Is patient current with immunizations? Yes.    She  reports that she has never smoked. She has never used smokeless tobacco. She reports that she does not drink alcohol and does not use drugs.  Counseling given: Not Answered      DPA/Advanced directive: Patient has Advanced Directive on file.     ROS:    Gait: Uses somebodies arm/hand   Ostomy: No   Other tubes: No   Amputations: No   Chronic oxygen use No   Last eye exam about a year ago   Wears hearing aids: No   : Reports urinary leakage during the last 6 months that has somewhat interfered with their daily activities or sleep.    Screening:    Depression Screening  Little interest or pleasure in doing things?  0 - not at all  Feeling down, depressed, or hopeless? 0 - not at all      Screening for Cognitive Impairment  Three Minute Recall (captain, lenore, picture)  0/3    Draw clock face with all 12 numbers and set the hands to show 5 past 8.  No    Pt has dementia    Fall Risk Assessment  Has the patient had two or more falls in the last year or any fall with injury in the last year?  Yes    Safety Assessment  Throw rugs on floor.  No  Handrails on all stairs.  Yes  Good lighting in all hallways.  Yes  Difficulty hearing.  No  Patient counseled about all safety risks that were identified.    Functional Assessment ADLs  Are there any barriers preventing you from cooking for yourself or meeting nutritional needs?  Yes.    Are there any barriers preventing you from driving safely or obtaining transportation?  Yes.    Are there any barriers preventing you from using a telephone or calling for help?  Yes.    Are there any  "barriers preventing you from shopping?  Yes.    Are there any barriers preventing you from taking care of your own finances?  Yes.    Are there any barriers preventing you from managing your medications?    Yes.    Are there any barriers preventing you from showering, bathing or dressing yourself?  Yes.    Are you currently engaging in any exercise or physical activity?  No.     What is your perception of your health?  Fair.    Health Maintenance Summary                Annual Wellness Visit Next Due 4/17/2022      Done 4/16/2021 Visit Dx: Medicare annual wellness visit, subsequent     Patient has more history with this topic...    BONE DENSITY Next Due 9/18/2023      Done 9/18/2018 DS-BONE DENSITY STUDY (DEXA)    IMM DTaP/Tdap/Td Vaccine Next Due 3/19/2030      Done 3/19/2020 Imm Admin: Tdap Vaccine     Patient has more history with this topic...          Patient Care Team:  Lisa Pratt M.D. as PCP - General (Family Medicine)  Elias Roper M.D. (Neurology)    Social History     Tobacco Use   • Smoking status: Never Smoker   • Smokeless tobacco: Never Used   Substance Use Topics   • Alcohol use: No   • Drug use: No     Family History   Problem Relation Age of Onset   • Heart Failure Mother    • Stroke Father    • Heart Failure Father    • No Known Problems Maternal Grandmother    • No Known Problems Maternal Grandfather    • No Known Problems Paternal Grandmother    • No Known Problems Paternal Grandfather    • Cancer Sister      She  has a past medical history of Brain bleed (HCC) (2008), Dementia (HCC), Seizure (HCC), and Urinary tract infection.   History reviewed. No pertinent surgical history.      Exam:   /60 (BP Location: Right arm, Patient Position: Sitting, BP Cuff Size: Adult)   Pulse 71   Temp 36.5 °C (97.7 °F) (Temporal)   Ht 1.626 m (5' 4\")   Wt 58.1 kg (128 lb)   SpO2 93%  Body mass index is 21.97 kg/m².    Hearing fair, bilateral cerumen impaction  Dentition good  Alert, oriented in " no acute distress.  Eye contact is good, speech goal directed, affect calm  Tinetti Balance & Gait Assessment:     Balance Assessment:   1. Sitting Balance:steay and safe (1)  2. Rises from chair: Ablet o rise without using arms help (2)  3. Attempts to rise: Able to rise, requires one attempt (2)  4. Standing balance: Steady, does not uses walker or other for support (2)  5. Nudged: steady (2)  6. Eyes closed: steady (1)  7. Turns 360 degree: Unsteady (grabs, catches) (0)  8. Sitting down: Do not use arms, smooth motion (2)  Total point: 12    Gait Assessment:   1. Indication of gait: No hesistancy (1)  2. Step length and Height: Step through right (1) and Step through left (1)  3. Foot clearance: left foot clears the floor (1) and right foot clears the floor (1)  4. Step symmetry: right and left step length are not equal (0)  5. Step continuity: Stopping or discontinuity between steps (0)  6. Path: Mild/moderate deviation or uses a walking aid (1)  7. Trunk: No sway, no flexion or back use of arms or walking aid (2)  8. Walking time: heel apart (0)  Total point: 8    Assessment and Plan. The following treatment and monitoring plan is recommended:      1. complex partial seizure  Chronic, currently taking Vimpat 50 mg BID   is concerned of oversedation associated with Vimpat and has been cutting down dosage of Vimpat. He has appointment with neurology in near future. Pt has not had any seizure since last OV.     2. Essential hypertension  Chronic, previously taking anti-hypertension, but discontinued due to low blood pressure and hx of gait imbalance.  BP remains normal in clinic today.   Will cont to monitor.     3. Mixed hyperlipidemia  Chronic, controlled with Crestor 20 mg qd, no s/e reported, will continue.      4. Age-related osteoporosis without current pathological fracture  Declined treatment   Cont vitamin D and Calcium   Pt fell a few weeks ago and was seen by  in [place], no sustain injuries per  her .   - Calcium and Vitamin D  - referred to PT for gait.   - Limit caffeine intake to 2 or fewer servings per day  - Limit alcohol consumption to one drink per day  - pt was counseled on fall risk prevention       5. History of hemorrhagic cerebrovascular accident (CVA) with residual deficit x2  6. Vascular dementia without behavioral disturbance (HCC)  Doing well, f/u with neurology as directed.     7. Risk for falls  - Patient identified as fall risk.  Appropriate orders and counseling given.  - REFERRAL TO PHYSICAL THERAPY    8. Medicare annual wellness visit, subsequent  - Subsequent Annual Wellness Visit - Includes PPPS ()    9. Bilateral impacted cerumen  Bilateral cerumen impaction noted on exam.   - curettage.     Procedure note: ear wax removal.   I personally removed ear wax from both ears using a lighted curette pt tolerated the procedure well, no immediate complication noted.         Services suggested: No services needed at this time  Health Care Screening recommendations as per orders if indicated.  Referrals offered: PT/OT/Nutrition counseling/Behavioral Health/Smoking cessation as per orders if indicated.    Discussion today about general wellness and lifestyle habits:    · Prevent falls and reduce trip hazards; Cautioned about securing or removing rugs.  · Have a working fire alarm and carbon monoxide detector;   · Engage in regular physical activity and social activities     Follow-up: Return in about 6 months (around 10/16/2021) for Multiple issues.

## 2021-04-19 PROBLEM — I87.2 VENOUS INSUFFICIENCY: Status: ACTIVE | Noted: 2021-01-01

## 2021-04-19 NOTE — PROGRESS NOTES
Spoke to patient. Symptoms are likely venous insufficiency, workups done by UC were negative. Trial of Lasix/K+ supplement sent to pharmacy. Instructions provided by phone.   Lisa Pratt M.D.

## 2021-04-27 NOTE — OP THERAPY EVALUATION
Outpatient Physical Therapy  INITIAL EVALUATION    [facility] Outpatient Physical Therapy  [address]  [place] [address]  Phone:  [phone]  Fax:  [phone]    Date of Evaluation: 2021    Patient: Keiko Camacho  YOB: 1934  MRN: [id number]     Referring Provider: [place] [address] [address]   Referring Diagnosis Risk for falls [Z91.81]     Time Calculation    Start time: 0800  Stop time: 0900 Time Calculation (min): 60 minutes             Chief Complaint: Loss Of Balance and Difficulty Walking    Visit Diagnoses     ICD-10-CM   1. Risk for falls  Z91.81       No data found  Subjective   History of Present Illness:     History of chief complaint:  Patient is a 86 year old female who presents for evaluation for balance issues. She presents with her  Raul. Per medical records patient fell in the first week of April while attempting to walk down the steps.  Her  took her to urgent care in [place].  Left ankle and left knee x-ray were negative for acute fracture.  Patient developed acute left leg edema a few days later.  Her  took her to urgent care in [place] again.  Venous Doppler of the left leg was negative for DVT.      Medical records show history of CVA 2018, seizures (2018). As well as Dementia.     R ankle is swollen and with red     Pain:     Current pain ratin    Quality:  Aching    Pain timing:  All day    Aggravating factors:  Stair climbing  Standing to ground and ground to standing    Pain comments:  R ankle swelling and pain     Activity Tolerance:     Current activity tolerance / Recreational activities:  Not doing much, small walks, house and medial appointments     Social Support:     Lives in:  One-story house (1-2 steps to front and back patio)    Diagnostic Tests:     X-ray: normal      Extremity ultrasound study: normal      Treatments:     Treatments to date:  Patient has been  taking lacosamide 50 mg twice daily for complex partial seizure.  This condition is currently being managed by neurology.  Last visit with neurology was in 2020.  Her  is concerned of oversedation with Vimpat.  He has been tapering her off this medication.  He continues to observe for symptoms of seizure.  He had appointment to follow-up with neurology in near future.        Past Medical History:   Diagnosis Date   • Brain bleed ([facility])    • Dementia ([facility])     Baseline: A&O x1   • Seizure ([facility])    • Urinary tract infection      No past surgical history on file.    Precautions:       Objective   Observation and functional movement:  She is quiet and reserved (likely from history of brain bleed).   Able to follow direction if things are mimiced. Hard time following 1-2 step directions     Range of motion and strength:    Active range of motion is within functional limits.    Stiff post HS but within normal limts    Strength hip flexor/quad 4/5  HS 3+ not able to tolerate resistance     Sensation and reflexes:     Sensation is intact.      Reflexes are normal and symmetrical.    Palpation and joint mobility:     No tenderness to palpation noted.    Limtited R ankle range compared to L     Special tests:      REEVES 38/56    TU sec     Balance:     Sitting (static): Normal    Sitting (dynamic): Good    Standing (static): Fair -    Standing (dynamic): Poor +    Does not lift feet when walkingm slight shuffle at times.     Coordination and tone:     Coordination is off at time, poor motor programing     Basic self care and IADL's:     Rely's on husbands help for a lot but she can dress herself.      Cognition and visual perception:     Slow motor functioning, unsure if related to brain bleed, dementia related or other language barrier.             Therapeutic Exercises (CPT 32628):     1. Develop HEP     20. UPOC 21      Therapeutic Exercise Summary: Access Code: [email or web address]  URL:  [email or web address]  Date: 04/27/2021  Prepared by: Loy Spencer    Exercises  Heel Toe Raises with Counter Support - 1 x daily - 5 x weekly - 2 sets - 10 reps  Seated Ankle Pumps - 1 x daily - 5 x weekly - 2 sets - 10 reps        Time-based treatments/modalities:    Physical Therapy Timed Treatment Charges  Therapeutic exercise minutes (CPT 15702): 15 minutes      Assessment, Response and Plan:   Impairments: abnormal gait, abnormal muscle tone, impaired functional mobility, impaired physical strength, safety issue and swelling    Assessment details:  Keiko is a pleasant but resevered lady with recent fall down some stairs. She has history of brain bleed (Stroke) and some associated dementia. She has a number of balance and gait limitations but also some swelling and range issues on the R LE. She warrants therapy for movement based work but also balance work. I think we can get her ankle feeling better and also moving better as well.    Barriers to therapy:  Cognition, communication, age and comorbidities  Prognosis: fair    Goals:   Short Term Goals:   1. Develop HEP  2. Establish REEVES balance scale  3. Establish ground to stand/recover ability with external support.   4. Patient to show ability to ascend/descend 3 steps using hha x 2   Short term goal time span:  2-4 weeks      Long Term Goals:    1. Update and progress HEP  2. REEVES balance scale +5 points  3. Patient able to show half kneel to standing using external support and hha x 2  4. Patient able to go up 3 step with hha x 1   Long term goal time span:  4-6 weeks    Plan:   Therapy options:  Physical therapy treatment to continue  Planned therapy interventions:  Manual Therapy (CPT 20226), Neuromuscular Re-education (CPT 54166), Therapeutic Exercise (CPT 20138) and Gait Training (CPT 93969)  Frequency:  2x week  Duration in weeks:  6  Duration in visits:  10  Plan details:  10 visits then update progress       Functional Assessment Used  Reeves  Balance Total Score (0-56): 38     Referring provider co-signature:  I have reviewed this plan of care and my co-signature certifies the need for services.    Certification Period: 04/27/2021 to  05/28/21    Physician Signature: ________________________________ Date: ______________

## 2021-04-29 NOTE — OP THERAPY DAILY TREATMENT
"  Outpatient Physical Therapy  DAILY TREATMENT     [facility] Outpatient Physical Therapy  [address]  [place] [address]  Phone:  [phone]  Fax:  [phone]    Date: 04/29/2021    Patient: Keiko Camacho  YOB: 1934  MRN: [id number]     Time Calculation    Start time: 1500  Stop time: 1545 Time Calculation (min): 45 minutes         Chief Complaint: Difficulty Walking, Fall, and Ankle Problem    Visit #: 2    SUBJECTIVE:  Keiko is a pleasant but resevered lady with recent fall down some stairs. She has history of brain bleed (Stroke) and some associated dementia. She has a number of balance and gait limitations but also some swelling and range issues on the R LE. She warrants therapy for movement based work but also balance work.    Presents with  Nj who helps with translation and cues for tasks.     OBJECTIVE:  Current objective measures:           Therapeutic Exercises (CPT 48339):     1. Review HEP    2. Nu step , Level 5 x 5 min    3. Gastroc stretch     4. Step up work , 4\"/6\" box    5. Step over half roller, Hard to follow therapist pattern    6. Tandem stepping patterns      Therapeutic Exercise Summary: HEP from Eval   Exercises  Heel Toe Raises with Counter Support - 1 x daily - 5 x weekly - 2 sets - 10 reps  Seated Ankle Pumps - 1 x daily - 5 x weekly - 2 sets - 10 reps       Therapeutic Treatments and Modalities:     1. Manual Therapy (CPT 67476), R ankle , Gentle IASTM to gastroc/ankle, Joint mobs     Time-based treatments/modalities:    Physical Therapy Timed Treatment Charges  Manual therapy minutes (CPT 26317): 15 minutes  Therapeutic exercise minutes (CPT 76313): 15 minutes        ASSESSMENT:   Response to treatment: She moves slowly and needs significant amount of cues. Will continue to work on ankle and balance tasks.     PLAN/RECOMMENDATIONS:   Plan for treatment: therapy treatment to continue next visit.  Planned interventions for next " visit: continue with current treatment.

## 2021-05-13 NOTE — OP THERAPY DAILY TREATMENT
Outpatient Physical Therapy  DAILY TREATMENT     [facility] Outpatient Physical Therapy  [address]  [place] [address]  Phone:  [phone]  Fax:  [phone]    Date: 05/13/2021    Patient: Keiko Camacho  YOB: 1934  MRN: [id number]     Time Calculation    Start time: 0832  Stop time: 0900 Time Calculation (min): 28 minutes         Chief Complaint: Difficulty Walking and Loss Of Balance    Visit #: 3    SUBJECTIVE:  Keiko is a pleasant but resevered lady with recent fall down some stairs. She has history of brain bleed (Stroke) and some associated dementia.     She comes back without her  today. She is hesitant to participate in activites    OBJECTIVE:  Current objective measures:           Therapeutic Exercises (CPT 44968):     1. Review HEP    2. Nu step , Level 5 x 5 min    3. Gastroc stretch     5. Single arm pull , 20#, 10 x 2    6. Single arm push, 5# , 10 x 2     9. Step over hurdles , Forward, x 2       Therapeutic Exercise Summary: HEP from Eval   Exercises  Heel Toe Raises with Counter Support - 1 x daily - 5 x weekly - 2 sets - 10 reps  Seated Ankle Pumps - 1 x daily - 5 x weekly - 2 sets - 10 reps         Time-based treatments/modalities:    Physical Therapy Timed Treatment Charges  Therapeutic exercise minutes (CPT 20125): 30 minutes        ASSESSMENT:   Response to treatment: She moves slowly and needs significant amount of cues. Next time we will have her  accompany us to ease the  PLAN/RECOMMENDATIONS:   Plan for treatment: therapy treatment to continue next visit.  Planned interventions for next visit: continue with current treatment.

## 2021-05-18 NOTE — OP THERAPY DAILY TREATMENT
Outpatient Physical Therapy  DAILY TREATMENT     [facility] Outpatient Physical Therapy  [address]  [place] [address]  Phone:  [phone]  Fax:  [phone]    Date: 05/18/2021    Patient: Keiko Camacho  YOB: 1934  MRN: [id number]     Time Calculation    Start time: 0805  Stop time: 0900 Time Calculation (min): 55 minutes         Chief Complaint: Difficulty Walking, Fall, and Loss Of Balance    Visit #: 4    SUBJECTIVE:  Keiko is a pleasant but resevered lady with recent fall down some stairs. She has history of brain bleed (Stroke) and some associated dementia.     She comes back without her  today. She is hesitant to participate in activites    OBJECTIVE:  Current objective measures:           Therapeutic Exercises (CPT 87125):     1. Review HEP    2. Nu step , Level 5 x 5 min    3. Gastroc stretch     7. Foam roller steps , step over forward and lateral steps    9. Step over hurdles , Forward, x 2       Therapeutic Exercise Summary: HEP from Eval   Exercises  Heel Toe Raises with Counter Support - 1 x daily - 5 x weekly - 2 sets - 10 reps  Seated Ankle Pumps - 1 x daily - 5 x weekly - 2 sets - 10 reps         Time-based treatments/modalities:    Physical Therapy Timed Treatment Charges  Therapeutic exercise minutes (CPT 16814): 55 minutes        ASSESSMENT:   Response to treatment: She needs max cues or demonstration for tasks. Doues well today.     PLAN/RECOMMENDATIONS:   Plan for treatment: therapy treatment to continue next visit.  Planned interventions for next visit: continue with current treatment.

## 2021-05-20 NOTE — OP THERAPY DAILY TREATMENT
Outpatient Physical Therapy  DAILY TREATMENT     [facility] Outpatient Physical Therapy  [address]  [place] [address]  Phone:  [phone]  Fax:  [phone]    Date: 05/20/2021    Patient: Keiko Camacho  YOB: 1934  MRN: [id number]     Time Calculation    Start time: 1005  Stop time: 1100 Time Calculation (min): 55 minutes         Chief Complaint: Difficulty Walking, Knee Problem, and Ankle Problem    Visit #: 5    SUBJECTIVE:  Keiko is a pleasant but resevered lady with recent fall down some stairs. She has history of brain bleed (Stroke) and some associated dementia.     She is more compliant today. Which is good.     OBJECTIVE:  Current objective measures:           Therapeutic Exercises (CPT 65637):     1. Review HEP    2. Nu step , Level 4 x 830 min, 500 steps    3. Gastroc stretch     5. 2 foam pads , large step lunge , 10 each       Therapeutic Exercise Summary: HEP from Eval   Exercises  Heel Toe Raises with Counter Support - 1 x daily - 5 x weekly - 2 sets - 10 reps  Seated Ankle Pumps - 1 x daily - 5 x weekly - 2 sets - 10 reps       Therapeutic Treatments and Modalities:     1. Neuromuscular Re-education (CPT 29117), Balance ankes, Obstacle course. up down, over narrow, compliant surface.     Time-based treatments/modalities:    Physical Therapy Timed Treatment Charges  Neuromusc re-ed, balance, coor, post minutes (CPT 99900): 15 minutes  Therapeutic exercise minutes (CPT 49268): 45 minutes        ASSESSMENT:   Response to treatment: She does better today. encouraged more visit but they have family coming and may interrupt our session   PLAN/RECOMMENDATIONS:   Plan for treatment: therapy treatment to continue next visit.  Planned interventions for next visit: continue with current treatment.

## 2021-05-24 PROBLEM — R73.03 PREDIABETES: Status: ACTIVE | Noted: 2021-01-01

## 2021-05-24 NOTE — PROGRESS NOTES
"Subjective:      Keiko Camacho is a 86 y.o. female who presents with her  Nj, as always, for follow-up, with a history of a left temporal hemorrhage with subdural hematoma and symptomatic vascular dementia and seizures, now off Vimpat.    HPI    As always, the history is gotten from Nj.  The patient still incapable of giving anything cogent, she is doing a little bit better off Vimpat.  The anger issues are no longer there, but she is still dealing with the visual delusions.  In the bathroom, she sees reflections of objects such as the bathrooms, and to her these are real human beings.  This happens quite frequently.  She also has feelings of suspiciousness as if people are in their house with them.  Nj seems to be able to distract her and redirect her attention.    In general things are much easier.  She still requires assistance with her ADLs, but now personal hygiene has been directly addressed and she is no longer having recurrent UTIs.  They have also taken her off her antihypertensives, she longer has the episodes of dizziness or actual syncope.  The language difficulties make teaching her and returning some independence more difficult.  She is at least in physical therapy to help with her gait which has always been distorted since her hemorrhage.    She has had no events suggestive of seizures with sudden altered sensorium and right upper and lower extremity motor symptoms.  Her most recent EEG from February, 2021 revealed some left hemispheric slowing but nothing paroxysmal or ictal in nature.  Afterwards she stopped the Vimpat without issue.    Medical, surgical and family histories are reviewed, there are no new drug allergies.  She is on Crestor 20 mg daily, vitamin D with calcium and potassium.    Review of Systems   Unable to perform ROS: Dementia        Objective:     /60 (BP Location: Right arm)   Pulse 74   Temp 36.4 °C (97.5 °F) (Temporal)   Ht 1.626 m (5' 4\")   Wt 58.4 kg (128 " lb 12 oz)   LMP  (LMP Unknown)   SpO2 95%   BMI 22.10 kg/m²      Physical Exam    She appears in no acute distress.  She is clean and appropriately dressed, she is cooperative within constraints of her aphasia.  Her vital signs are stable.  There is no malar rash.  Her neck is supple.  Cardiac evaluation is unremarkable.    Neurological Exam    There is significant global aphasia, unchanged.  She has difficulty following commands, she is perseverative and at times feel dependent still.  She does name but it takes a while to find the right word, paraphasic errors are often used.  Again with some time and repetition, she actually will repeat sentences.  There is a mild degree of apraxia and agnosia.  She recognizes some of these unusual thoughts and concerns when she sees people in the mirror.  She cannot be more specific as to who they are, she does state they make her feel frightened.    PERRLA/EOMI, visual fields are full, facial movements are symmetric.  Sensory exam is intact to temperature.  The tongue and uvula are midline, there is no bulbar dysfunction.  Head rotation is intact.    Musculoskeletal exam reveals normal tone.  Strength is grossly intact bilaterally.    She is very cautious as she walks, she requires some assistance with her 's arm, station is widened.  Her language deficit prevents assessment of tandem walking as she cannot follow commands to do so.  He does grasp the examiner's finger with either of the upper extremities without clear ataxia.  She opens and closes both hands slowly but still symmetrically.  I cannot get her to follow commands to tap her toes.    Sensory exam appears intact to vibration and temperature throughout.     Assessment/Plan:     1. Complex partial seizure  Stable, she can remain off Vimpat given her most recent EEG and history of being seizure-free on medication, and even off medicine for the last couple of months.  I think her risk of recurrence is  relatively small though she has underlying structural pathology and correlating slowing on her EEG.  We will simply keep an eye on this.  Fortunately, given her dementia, she is not likely to get herself into trouble in place herself or Nj at risk.  We can follow-up in 1 year.    2. Vascular dementia without behavioral disturbance (HCC)  This is the bigger issue, the behavioral problems and visual delusions she is now experiencing will persist for a while, Nj seems to be able to distract her attention so that her behaviors not otherwise distorted.  She is much better off the Vimpat only comes with anger and emotional lability.  Nj states that their marriage has been much more healthy as a result of all of this.  Hopefully they can get something out of the physical therapy with gait training given her limited ability to comprehend.  She is eating.  She still has a tendency to want to sleep, but I am not sure how much can be done for this.    Time: 20 minutes spent face-to-face for exam, review, discussion, and education, of this over 70% of the time spent counseling and coordinating care.

## 2021-05-25 NOTE — OP THERAPY DAILY TREATMENT
Outpatient Physical Therapy  DAILY TREATMENT     [facility] Outpatient Physical Therapy  [address]  [place] [address]  Phone:  [phone]  Fax:  [phone]    Date: 05/25/2021    Patient: Keiko Camacho  YOB: 1934  MRN: [id number]     Time Calculation    Start time: 1100  Stop time: 1200 Time Calculation (min): 60 minutes         Chief Complaint: Loss Of Balance and Difficulty Walking    Visit #: 6    SUBJECTIVE:  Keiko is a pleasant but resevered lady with recent fall down some stairs. She has history of brain bleed (Stroke) and some associated dementia.          OBJECTIVE:  Current objective measures:           Therapeutic Exercises (CPT 62057):     1. Review HEP    2. Nu step , Level 4 x 830 min, 500 steps    3. Gastroc stretch     5. 2 foam pads , large step lunge , 10 each     6. Lateral side step , orange band     8. Fall recover work       Therapeutic Exercise Summary: HEP from Eval   Exercises  Heel Toe Raises with Counter Support - 1 x daily - 5 x weekly - 2 sets - 10 reps  Seated Ankle Pumps - 1 x daily - 5 x weekly - 2 sets - 10 reps       Therapeutic Treatments and Modalities:     1. Manual Therapy (CPT 93095), Ankle check, R 1.5 cm larger than L, K tape to R medial ankle for swelling    Time-based treatments/modalities:    Physical Therapy Timed Treatment Charges  Manual therapy minutes (CPT 02170): 20 minutes  Therapeutic exercise minutes (CPT 15695): 40 minutes    ASSESSMENT:   Response to treatment: She does better today. They may have to travel for family things but  we are encouraged as she is more accepting of tasks .     PLAN/RECOMMENDATIONS:   Plan for treatment: therapy treatment to continue next visit.  Planned interventions for next visit: continue with current treatment.

## 2021-06-01 NOTE — OP THERAPY DAILY TREATMENT
Outpatient Physical Therapy  DAILY TREATMENT     [facility] Outpatient Physical Therapy  [address]  [place] [address]  Phone:  [phone]  Fax:  [phone]    Date: 06/01/2021    Patient: Keiko Camacho  YOB: 1934  MRN: [id number]     Time Calculation                   Chief Complaint: Difficulty Walking    Visit #: 7    SUBJECTIVE:  Keiko is a pleasant but resevered lady. She had a another fall outside of the dentist last week. It was her L ankle and she is quite guarded with walking on it.        OBJECTIVE:  Current objective measures:           Therapeutic Exercises (CPT 79093):     1. Review HEP    2. Nu step , Level 4 x 830 min, 500 steps    3. Gastroc stretch     5. Gastroc rasises       Therapeutic Exercise Summary: HEP from Eval   Exercises  Heel Toe Raises with Counter Support - 1 x daily - 5 x weekly - 2 sets - 10 reps  Seated Ankle Pumps - 1 x daily - 5 x weekly - 2 sets - 10 reps       Therapeutic Treatments and Modalities:     1. Manual Therapy (CPT 55315), Ankle check, Floss band wrap and active circles for calf work.     2. E Stim Unattended (CPT 60337), L ankle IFC and ice x 15 mins    Time-based treatments/modalities:         ASSESSMENT:   Response to treatment: She is limited with her ability to walk and place weight on the foot.  will monitor and take for an xray as needed.       PLAN/RECOMMENDATIONS:   Plan for treatment: therapy treatment to continue next visit.  Planned interventions for next visit: continue with current treatment.

## 2021-06-04 NOTE — OP THERAPY DAILY TREATMENT
Outpatient Physical Therapy  DAILY TREATMENT     [facility] Outpatient Physical Therapy  [address]  [place] [address]  Phone:  [phone]  Fax:  [phone]    Date: 06/04/2021    Patient: Keiko Camacho  YOB: 1934  MRN: [id number]     Time Calculation    Start time: 0830  Stop time: 0900 Time Calculation (min): 30 minutes         Chief Complaint: Difficulty Walking, Fall, and Loss Of Balance    Visit #: 8    SUBJECTIVE:  Keiko is a pleasant but resevered lady. She is walking better after fall last week she is still timid.        OBJECTIVE:  Current objective measures:           Therapeutic Exercises (CPT 65460):     1. Review HEP    2. Nu step , Level 4 x 830 min, 500 steps    3. Gastroc stretch     5. Gastroc rasises     7. LEg raises , 40#/60#      Therapeutic Exercise Summary: HEP from Eval   Exercises  Heel Toe Raises with Counter Support - 1 x daily - 5 x weekly - 2 sets - 10 reps  Seated Ankle Pumps - 1 x daily - 5 x weekly - 2 sets - 10 reps       Therapeutic Treatments and Modalities:     1. Manual Therapy (CPT 10148), Ankle check, Floss band wrap and active circles for calf work.     2. E Stim Unattended (CPT 78519), L ankle IFC and ice x 15 mins    Time-based treatments/modalities:    Physical Therapy Timed Treatment Charges  Therapeutic exercise minutes (CPT 04625): 30 minutes    ASSESSMENT:   Response to treatment: She is doing well. Will continue to work on stepping and balance       PLAN/RECOMMENDATIONS:   Plan for treatment: therapy treatment to continue next visit.  Planned interventions for next visit: continue with current treatment.

## 2021-06-08 NOTE — OP THERAPY DAILY TREATMENT
"  Outpatient Physical Therapy  DAILY TREATMENT     [facility] Outpatient Physical Therapy  [address]  [place] [address]  Phone:  [phone]  Fax:  [phone]    Date: 06/08/2021    Patient: Keiko Camacho  YOB: 1934  MRN: [id number]     Time Calculation    Start time: 0830  Stop time: 0930 Time Calculation (min): 60 minutes         Chief Complaint: Ankle Problem, Foot Problem, Difficulty Walking, and Fall    Visit #: 9    SUBJECTIVE:  Keiko is here for follow up for physical therapy. She has trouble with gait and will often catch her feet. She has had several falls. She has history of brain bleeds and is not 100% cognitively intact. She need significant cueing     OBJECTIVE:  Current objective measures:           Therapeutic Exercises (CPT 31289):     1. Review HEP    2. Nu step , Level 4 x 830 min, 500 steps    3. Gastroc stretch     5. Gastroc rasises     6. TRX row, 2 x 10    7. Straight arm , Trunk rotation      Therapeutic Exercise Summary: HEP from Eval   Exercises  Heel Toe Raises with Counter Support - 1 x daily - 5 x weekly - 2 sets - 10 reps  Seated Ankle Pumps - 1 x daily - 5 x weekly - 2 sets - 10 reps       Therapeutic Treatments and Modalities:     1. Neuromuscular Re-education (CPT 10554), Obstacle course, 6\" rollers, steps forward and lateral steps , Needs HHA x 1/2 for tasks     Time-based treatments/modalities:    Physical Therapy Timed Treatment Charges  Neuromusc re-ed, balance, coor, post minutes (CPT 40172): 30 minutes  Therapeutic exercise minutes (CPT 79882): 30 minutes    ASSESSMENT:   Response to treatment: Trying to cue her less and make her make decisions about stepping on and over objects. Often times she make too long a step and puts herself in a compromised position.      PLAN/RECOMMENDATIONS:   Plan for treatment: therapy treatment to continue next visit.  Planned interventions for next visit: continue with current treatment.       "

## 2021-06-10 NOTE — OP THERAPY PROGRESS SUMMARY
Outpatient Physical Therapy  PROGRESS SUMMARY NOTE      [facility] Outpatient Physical Therapy  [address]  [place] [address]  Phone:  [phone]  Fax:  [phone]    Date of Visit: 06/10/2021    Patient: Keiko Camacho  YOB: 1934  MRN: [id number]     Referring Provider: [place] [address] [address]   Referring Diagnosis Risk for falls [Z91.81]     Visit Diagnoses     ICD-10-CM   1. Risk for falls  Z91.81       Rehab Potential: fair    Progress Report Period: 21-6/10/21    Functional Assessment Used  Reeves Balance Total Score (0-56): 42       Objective Findings and Assessment:   Patient progression towards goals: Keiko is a pleasant 86 year old lady with recent fall down some stairs. She has history of brain bleed (Stroke) and some associated dementia. She has a number of balance and gait limitations but also some swelling and range issues on the R LE. She is reliant on her  for most home tasks and initiating certain tasks.  She shows improvement and more trust in her self and in therapist. She has steady complaints about her ankle but not sure we will make more progress on this. We will continue care but focus on gait and balance      Short Term Goals:   1. Develop HEP GOAL MET ONGOING  2. Establish REEVES balance scale GOAL MET 38/56  3. Establish ground to stand/recover ability with external support. Requires HHA x 2 and mod assist via therapist  4. Patient to show ability to ascend/descend 3 steps using hha x 2 GOAL MET      Short term goal time span:  2-4 weeks      Long Term Goals:    1. Update and progress HEP GOAL MET  2. REEVES balance scale +5 points GOAL NOT MET 4 point  3. Patient able to show half kneel to standing using external support and hha x 2 GOAL MET        Objective findings and assessment details: Special tests:      (at eval end of April)  REEVES 38/56   TU sec     Today   REEVES/56  TU sec     Goals:    Short Term Goals:   1. Update and progress HEP   2. Patient to establish 6 min walk   3. Patient to show ground to recover using external support only    Short term goal time span:  2-4 weeks      Long Term Goals:    1. Update and progress HEP  2. 6 min walk +75 feet  3. REEVES balance 44 or better  Long term goal time span:  4-6 weeks    Plan:   Planned therapy interventions:  E Stim Unattended (CPT 28320), Neuromuscular Re-education (CPT 04689) and Therapeutic Exercise (CPT 66753)  Frequency:  2x week  Duration in weeks:  6  Duration in visits:  10      Referring provider co-signature:  I have reviewed this plan of care and my co-signature certifies the need for services.     Certification Period: 06/10/2021 to 07/15/21    Physician Signature: ________________________________ Date: ______________

## 2021-06-10 NOTE — OP THERAPY DAILY TREATMENT
Outpatient Physical Therapy  DAILY TREATMENT     [facility] Outpatient Physical Therapy  [address]  [place] [address]  Phone:  [phone]  Fax:  [phone]    Date: 06/10/2021    Patient: Keiko Camacho  YOB: 1934  MRN: [id number]     Time Calculation    Start time: 1030  Stop time: 1130 Time Calculation (min): 60 minutes         Chief Complaint: Difficulty Walking, Fall, and Loss Of Balance    Visit #: 10    SUBJECTIVE:  Keiko is here for follow up for physical therapy. She has had several falls. She has history of brain bleeds and is not 100% cognitively intact. She need significant cueing. We are slightly late on updating POC and progress and will do testing today.     OBJECTIVE:  Current objective measures:   Reeves Balance Total Score (0-56): 42  Special tests:      REEVES 38/56     TU sec     Short Term Goals:   1. Develop HEP GOAL MET ONGOING  2. Establish REEVES balance scale GOAL MET 38/56  3. Establish ground to stand/recover ability with external support. Requires HHA x 2 and mod assist via therapist  4. Patient to show ability to ascend/descend 3 steps using hha x 2 GOAL MET  Short term goal time span:  2-4 weeks      Long Term Goals:    1. Update and progress HEP GOAL MET  2. REEVES balance scale +5 points GOAL NOT MET 4 point  3. Patient able to show half kneel to standing using external support and hha x 2 GOAL MET      Therapeutic Exercises (CPT 40304):     1. Review HEP    2. Nu step , Level 4 x 1030 min, 500 steps    3. Gastroc stretch     5. Gastroc rasises       Therapeutic Exercise Summary: HEP from Eval   Exercises  Heel Toe Raises with Counter Support - 1 x daily - 5 x weekly - 2 sets - 10 reps  Seated Ankle Pumps - 1 x daily - 5 x weekly - 2 sets - 10 reps       Therapeutic Treatments and Modalities:     1. Neuromuscular Re-education (CPT 92574), REEVES balance test , 38/56 at intiial, 42/56 today, TUG test , 32 sec at intital. 22 sec  today    Time-based treatments/modalities:    Physical Therapy Timed Treatment Charges  Neuromusc re-ed, balance, coor, post minutes (CPT 18670): 30 minutes  Therapeutic exercise minutes (CPT 55279): 30 minutes    ASSESSMENT:   Response to treatment: She show improvement and more trust in her self and in therapist. She has steady complaints about her ankle but not sure we will make more progress on this. We will continue care but focus on gait and balance.     PLAN/RECOMMENDATIONS:   Plan for treatment: therapy treatment to continue next visit.  Planned interventions for next visit: continue with current treatment.

## 2021-06-22 NOTE — OP THERAPY DAILY TREATMENT
"  Outpatient Physical Therapy  DAILY TREATMENT     [facility] Outpatient Physical Therapy  [address]  [place] [address]  Phone:  [phone]  Fax:  [phone]    Date: 2021    Patient: Keiko Camacho  YOB: 1934  MRN: [id number]     Time Calculation    Start time: 0935  Stop time: 1030 Time Calculation (min): 55 minutes         Chief Complaint: Difficulty Walking and Loss Of Balance    Visit #: 11    SUBJECTIVE:  Keiko is here for follow up for physical therapy. She is sour after some family visit. She gets confused today. She is a a little \"grumpy\" today.  thinks that its due to all the family.     OBJECTIVE:  Current objective measures:      Special tests:      REEVES 38/56     TU sec     Short Term Goals:   1. Develop HEP GOAL MET ONGOING  2. Establish REEVES balance scale GOAL MET /56  3. Establish ground to stand/recover ability with external support. Requires HHA x 2 and mod assist via therapist  4. Patient to show ability to ascend/descend 3 steps using hha x 2 GOAL MET  Short term goal time span:  2-4 weeks      Long Term Goals:    1. Update and progress HEP GOAL MET  2. REEVES balance scale +5 points GOAL NOT MET 4 point  3. Patient able to show half kneel to standing using external support and hha x 2 GOAL MET      Therapeutic Exercises (CPT 15567):     1. Review HEP    2. Nu step , Level 4 x 1030 min, 500 steps, Seat     3. Gastroc stretch     5. Floor to recovery issues I with UE support x 1     6. Stepping patterns , forward/backwards/lateral and cross over.     7. Weighted pulley walking forward and backwards, 10/15#      Therapeutic Exercise Summary: HEP from Eval   Exercises  Heel Toe Raises with Counter Support - 1 x daily - 5 x weekly - 2 sets - 10 reps  Seated Ankle Pumps - 1 x daily - 5 x weekly - 2 sets - 10 reps       Therapeutic Treatments and Modalities:     1. Neuromuscular Re-education (CPT 18951), REEVES balance test , /56 at " intiial, 42/56 today, TUG test , 32 sec at intital. 22 sec today    Time-based treatments/modalities:    Physical Therapy Timed Treatment Charges  Therapeutic exercise minutes (CPT 75350): 45 minutes    ASSESSMENT:   Response to treatment: She is aggreable but confused today. They have more family coming into town so we will follow up again in a couple of weeks.     PLAN/RECOMMENDATIONS:   Plan for treatment: therapy treatment to continue next visit.  Planned interventions for next visit: continue with current treatment.

## 2021-10-14 NOTE — TELEPHONE ENCOUNTER
1. Name: DALLAS MARKS  Call Back Number: [phone]      How would the patient prefer to be contacted with a response: Phone call OK to leave a detailed message    2. Patient is requesting orders for LABS    3. Clinical Reason for Request: PATIENT REQUESTING LABS TO COMPLETE BEFORE APPOINTMENT WITH PCP.    4. Specialty & Provider Name/Lab/Imaging Location Preference: patient did not specify.    5. Is appointment scheduled for requested order/referral: yes - December 3 @ 8:00am     Patient was informed they may receive a return phone call from our office with any additional questions before processing this request.    PLEASE NOTIFY PATIENT ONCE LABS ARE ORDERED. THEY WOULD LIKE THEM TO BE MAILED TO THEM.

## 2021-10-14 NOTE — PROGRESS NOTES
"Keiko Camacho is a 87 y.o. female here for a non-provider visit for:   FLU    Reason for immunization: Annual Flu Vaccine  Immunization records indicate need for vaccine: Yes, confirmed with Epic  Minimum interval has been met for this vaccine: Yes  ABN completed: Not Indicated    VIS Dated   was given to patient: Yes  All IAC Questionnaire questions were answered \"No.\"    Patient tolerated injection and no adverse effects were observed or reported: Yes    Pt scheduled for next dose in series: No    "

## 2021-11-14 PROBLEM — S06.33AA INTRAPARENCHYMAL HEMATOMA OF BRAIN (HCC): Status: ACTIVE | Noted: 2021-01-01

## 2021-11-14 NOTE — PROGRESS NOTES
"Pt transfer to T216 with all belongings. Bereavement tray and \"Helpful Information for this Difficult Time\" booklet provided to pt family.   "

## 2021-11-14 NOTE — PROGRESS NOTES
Report received, pt care assumed. Pt incontinent of stool and urine. Pt cleansed and linen changed. Magallanes catheter placed per MD orders.

## 2021-11-14 NOTE — DISCHARGE PLANNING
Medical Social Work     SW received a call from the RN requesting SW assistance with checking on the family. SW met with the pt and daughter Julia and the pt  Raul at bedside and provided emotional support to the family. SW answered all questions the family had.     Plan: SW will remain available for pt and family support.    Clothing/LOCKER 4

## 2021-11-14 NOTE — HOSPITAL COURSE
Ms Camacho has past medical history that includes dementia, prior intraparenchymal hemorrhage, seizure disorder,advanced global aphasia and difficulties with paranoia.  The patient is nonverbal during original  ER exam and thus unable to provide any history of present illness.  Her  was at the bedside.  He describes relatively normal day yesterday although he is questioning now whether she had some intermittent difficulties with her right side.  This morning patient was found on her floor by her  unable to use her right side.  She is brought to emergency room for evaluation where a CT scan of the head showed a large left-sided intraparenchymal hemorrhage with probable ongoing extravasation and midline shift. NRSY was consulted and do not recommend any surgical interventions at this time based on patients age and functionality.  Admitting physician had an extensive discussion with the patient's , her daughter, and grandson.  Patient's  is quite sure that patient would choose comfort measures for herself at this time and discussed admission to the hospital for comfort care.

## 2021-11-14 NOTE — ED NOTES
Notified ERP of patient's blood pressure of 86/53, new orders placed for NS bolus 500 cc. NS bolus 500 cc administered per MAR.

## 2021-11-14 NOTE — PROGRESS NOTES
Neurology note    87F with vascular dementia, history of L temporal lobe ICH, complex partial seizures, previously on vimpat, but discontinued due to behavioral issues, MRI brain in May 2019 with evidence of amyloid angiopathy.  She is currently followed by Dr. Roper with Neurology.  Not on blood thinners- she presents after  found her on the floor with R side weakness, non-verbal, with last known well at 1900 on 11/13.  CTH with large 7cm x 3cm predominately L frontal acute intraparencymal hemorrhage.  There is appproximately 10mm of midline shift, no evidence of uncal herniation.    Ms. Camacho already requires significant assistance with ADLs, and her history of recurrent brain hemorrhages is likely secondary to her amyloid angiopathy, for which there is no treatment option.  Today's bleed will cause the complete loss of any retained functional independence of ADLs that she may have- and her current deficits- R side weakness and global aphasia will be long-term disabilities.  Will need GOC and palliative measures given these circumstances, and recommend comfort-focused measures.    Recommendations:   - GOC/palliative discussion with    - maintain SBP goal 100-140, IV anti-HTN PRN   - avoid all antithrombotics   - normal Na goal   - do not recommend surgical decompression given baseline status       Elvin Figueredo MD  Neurology

## 2021-11-14 NOTE — ED NOTES
Med Rec completed per patient's  at bedside  Allergies reviewed  No ORAL antibiotics in last 30 days

## 2021-11-14 NOTE — CONSULTS
DATE OF SERVICE:  11/14/2021     INPATIENT CONSULTATION     CHIEF COMPLAINT:  Massive intraparenchymal hemorrhages.     HISTORY OF PRESENT ILLNESS:  An 87-year-old right-handed woman with dementia,   although she still walks around the house and feeds herself, history of   multiple amyloid angiopathy hemorrhages who presents with a 7.5 x 3.5 cm   dominant left frontal lesion causing hemiparesis, left gaze preference and   aphasia.     PAST MEDICAL HISTORY:  As mentioned in the HPI.     PAST SURGICAL HISTORY:  As mentioned in the HPI.     SOCIAL HISTORY:    Her  is at her bedside.  She is nonsmoker,   nondrinker.     PHYSICAL EXAMINATION:  This woman has a left gaze preference.  She is aphasic   and nonverbal.  She has a right hemiparesis that is dense.  She is purposeful   on the left. Her pupils are symmetric.     ASSESSMENT AND PLAN:  The patient has a large intraparenchymal hemorrhage,   history of dementia and amyloid angiopathy.  This is a dominant lesion.  I do   not recommend surgery with her previous functionality.  I recommend stroke   neurology.  We thus admitted her for blood pressure control.  The family   agrees, does not want surgery that may leave her intubated and aphasic.     Thanks for allowing me to participate in her care.  I can be reached any time.        ______________________________  MD CAROLINA Davis III/ROGER/COCO    DD:  11/14/2021 07:24  DT:  11/14/2021 08:03    Job#:  [id number]

## 2021-11-14 NOTE — H&P
Hospital Medicine History & Physical Note    Date of Service  11/14/2021    Primary Care Physician  Lisa Pratt M.D.    Consultants  neurosurgery    Code Status  Comfort Care/DNR    Chief Complaint  Chief Complaint   Patient presents with   • Possible Stroke     LKW 1900. Pt's  found pt kneeling on the ground around 0415. Pt was unable to communicate and had Rt sided weakness       History of Presenting Illness  Danielle Camacho is a 87 y.o. female who presented 11/14/2021 with right sided weakness.    I have reviewed some of the recent provider documentation available to me in the patient's medical chart.  Records are briefly summarized: Ms Camacho has past medical history that includes dementia, prior intraparenchymal hemorrhage, and seizure disorder.  A note from outpatient neurology on 5/24/2021 details the patient's advanced global aphasia I had her difficulties with paranoia.    The patient is nonverbal during my exam and thus unable to provide any history of present illness.  Her  is at the bedside.  He describes relatively normal day yesterday although he is questioning now whether she had some intermittent difficulties with her right side.  This morning patient was found on her floor by her  unable to use her right side.  She is brought to emergency room for evaluation where a CT scan of the head showed a large left-sided intraparenchymal hemorrhage with probable ongoing extravasation and midline shift.    I had an extensive discussion with the patient's , her daughter, and grandson.  Patient's  is quite sure that patient would choose comfort measures for herself at this time.  We discussed admission to the hospital for comfort care.    I discussed the plan of care with patient, family, bedside RN and Dr. Laguna of Emergency Medicine.    Review of Systems  Review of Systems   Unable to perform ROS: Patient nonverbal       Past Medical History   has a past medical history  of Brain bleed (HCC) (2008), Dementia (HCC), Seizure (HCC), and Urinary tract infection.    Surgical History   has no past surgical history on file.     Family History  family history includes Cancer in her sister; Heart Failure in her father and mother; No Known Problems in her maternal grandfather, maternal grandmother, paternal grandfather, and paternal grandmother; Stroke in her father.     Social History   reports that she has never smoked. She has never used smokeless tobacco. She reports that she does not drink alcohol and does not use drugs.    Allergies  No Known Allergies    Medications  Prior to Admission Medications   Prescriptions Last Dose Informant Patient Reported? Taking?   CALCIUM PO 11/13/2021 at 1800 Significant Other Yes Yes   Sig: Take 1 Tablet by mouth every day.   Cholecalciferol (VITAMIN D3) 2000 UNIT Cap 11/13/2021 at 1800 Significant Other Yes No   Sig: Take 3,000 Units by mouth.   rosuvastatin (CRESTOR) 20 MG Tab 11/13/2021 at 1800 Significant Other No No   Sig: TAKE 1 TABLET EVERY EVENING   Patient taking differently: Take 20 mg by mouth every evening.      Facility-Administered Medications: None       Physical Exam  Temp:  [36.3 °C (97.4 °F)] 36.3 °C (97.4 °F)  Pulse:  [] 131  Resp:  [14-27] 27  BP: ()/(53-87) 107/61  SpO2:  [89 %-96 %] 96 %  Blood Pressure : 107/61   Temperature: 36.3 °C (97.4 °F)   Pulse: (!) 131   Respiration: (!) 27   Pulse Oximetry: 96 %       Physical Exam  Constitutional:       General: She is not in acute distress.     Appearance: Normal appearance. She is normal weight. She is not toxic-appearing.   HENT:      Head: Normocephalic and atraumatic.      Comments: Bruising left forehead     Right Ear: External ear normal.      Left Ear: External ear normal.      Nose: Nose normal.      Mouth/Throat:      Mouth: Mucous membranes are moist.      Pharynx: Oropharynx is clear.   Eyes:      Extraocular Movements: Extraocular movements intact.       Conjunctiva/sclera: Conjunctivae normal.      Pupils: Pupils are equal, round, and reactive to light.   Cardiovascular:      Rate and Rhythm: Normal rate and regular rhythm.      Pulses: Normal pulses.   Pulmonary:      Effort: Pulmonary effort is normal.      Breath sounds: Normal breath sounds.   Abdominal:      General: Abdomen is flat. Bowel sounds are normal. There is no distension.      Palpations: Abdomen is soft.      Tenderness: There is no abdominal tenderness.   Musculoskeletal:         General: Normal range of motion.      Cervical back: Normal range of motion and neck supple.   Skin:     General: Skin is warm and dry.      Capillary Refill: Capillary refill takes less than 2 seconds.      Coloration: Skin is not jaundiced.      Findings: Bruising present.   Neurological:      Mental Status: She is alert.      Comments: Patient is nonverbal  Right-sided neglect  Moves left side spontaneously         Laboratory:  Recent Labs     11/12/21 0621 11/14/21 0514   WBC 6.6 7.6   RBC 4.54 4.37   HEMOGLOBIN 13.6 13.3   HEMATOCRIT 42.8 40.9   MCV 94.3 93.6   MCH 30.0 30.4   MCHC 31.8* 32.5*   RDW 46.8 46.1   PLATELETCT 192 184   MPV 10.9 10.0     Recent Labs     11/12/21 0621 11/14/21 0514   SODIUM 140 141   POTASSIUM 4.1 4.0   CHLORIDE 105 108   CO2 25 23   GLUCOSE 96 121*   BUN 17 24*   CREATININE 0.69 0.56   CALCIUM 9.6 9.4     Recent Labs     11/12/21 0621 11/14/21 0514   ALTSGPT 6 6   ASTSGOT 9* 10*   ALKPHOSPHAT 67 62   TBILIRUBIN 0.6 0.4   GLUCOSE 96 121*     Recent Labs     11/14/21 0514   APTT 34.0   INR 1.07     No results for input(s): NTPROBNP in the last 72 hours.  Recent Labs     11/12/21 0621   TRIGLYCERIDE 92   HDL 42   LDL 70     Recent Labs     11/14/21 0514   TROPONINT 11       Imaging:  DX-CHEST-PORTABLE (1 VIEW)   Final Result         1. No acute cardiopulmonary abnormalities are identified.      CT-CTA NECK WITH & W/O-POST PROCESSING   Final Result      1. No evidence of flow-limiting  stenosis in the cervical carotid or cervical vertebral arteries.      CT-CTA HEAD WITH & W/O-POST PROCESS   Final Result   Addendum 1 of 1   Preliminary findings discussed with Dr. MONY RANGEL in  via [employer] on    11/14/2021 6:25 a AM      Final         1. No hemodynamically significant narrowing of the major intracranial vessels.      2. Redemonstration of large parenchymal hemorrhage in the left posterior frontal lobe. There is a focus of active bleeding in the posterior aspect of the hemorrhage.         CT-CEREBRAL PERFUSION ANALYSIS   Final Result      1.  Cerebral blood flow less than 30% likely representing completed infarct = 0 mL.      2.  T Max more than 6 seconds likely artifact infarct and ischemia = 65 mL.      3.  Mismatched volume = 65      4.  Please note that the cerebral perfusion was performed on the limited brain tissue around the basal ganglia region. Infarct/ischemia outside the CT perfusion sections can be missed in this study.      CT-HEAD W/O   Final Result         Limited exam due to motion artifact.      There is 3.8 x 6.8 cm parenchymal hemorrhage in the left posterior frontal lobe with significant mass effect.      There is a 7 mm left-to-right midline shift.      Preliminary findings discussed with Dr. MONY RANGEL via [employer] on 11/14/2021 5:37 AM                   X-Ray:  My impression is: There is a large left-sided intraparenchymal bleed with midline shift    Assessment/Plan:  I anticipate this patient is appropriate for observation status at this time.     Intraparenchymal hematoma of brain (HCC)- (present on admission)  Assessment & Plan  There is a large left-sided intraparenchymal hemorrhage   Nontraumatic, suspect this is related to amyloid angiopathy as patient has history of multiple bleeds  Patient is very developed midline shift, there is active extravasation, this is likely not a survivable event, if she did survive this would likely lose all of her remaining  independence  Family in agreement that the patient's goals of care would be comfort knowing the expected prognosis  Expect the patient to  within 24 hours  IV morphine, IV Ativan as needed for any indication of pain or dypsnea    Vascular dementia without behavioral disturbance (HCC)- (present on admission)  Assessment & Plan  Patient has dementia with loss of independence at baseline    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

## 2021-11-14 NOTE — ASSESSMENT & PLAN NOTE
There is a large left-sided intraparenchymal hemorrhage   Nontraumatic, suspect this is related to amyloid angiopathy as patient has history of multiple bleeds  Patient is very developed midline shift, there is active extravasation, this is likely not a survivable event, if she did survive this would likely lose all of her remaining independence  Family in agreement that the patient's goals of care would be comfort knowing the expected prognosis  Expect the patient to  within 24 hours  IV morphine, IV Ativan as needed for any indication of pain or dypsnea   0 = swallows foods/liquids without difficulty

## 2021-11-14 NOTE — ED TRIAGE NOTES
"Chief Complaint   Patient presents with   • Possible Stroke     LKW 1900. Pt's  found pt kneeling on the ground around 0415. Pt was unable to communicate and had Rt sided weakness     Pt BIB EMS for above complaint. Pt is alert but unable to communicate GCS 9. Pt has Lt side gaze, rt side flaccid, but movement in Lt side. NIH 24. FSBS 146 PTA.     BP (!) 183/85   Pulse 79   Resp 17   Ht 1.549 m (5' 1\")   Wt 54.4 kg (120 lb)   LMP  (LMP Unknown)   SpO2 89%   BMI 22.67 kg/m²     "

## 2021-11-14 NOTE — ED PROVIDER NOTES
ED Provider Note    ED Provider Note    Primary care provider: Lisa Pratt M.D.  Means of arrival: EMS  History obtained from: patient  History limited by: None    CHIEF COMPLAINT  Chief Complaint   Patient presents with   • Possible Stroke     LKW 1900. Pt's  found pt kneeling on the ground around 0415. Pt was unable to communicate and had Rt sided weakness       HPI  Danielle Camacho is a 87 y.o. female who presents to the Emergency Department via EMS.  She presents as a code stroke.  This is an elderly female who was found by her  at about 4 AM this morning, on her knees next to a chair.  He thought that she might be looking for her shoes which she sometimes does in the night.  But she was unable to speak and unable to get up, prompting a call to 911.  She last was seen by her  at about 2:00 in the morning.  Initially he had reported that it was 7 PM but he does note that she got up and walk to the bathroom at 2 or 2:30 in the morning before they both went back to sleep.  She has a history of dementia.  But she is able to walk and talk.  She has a remote history of an intracranial bleed back in 2010.  Her  notes that she recently discontinued her seizure medications.  She is followed by Dr. Soria.     REVIEW OF SYSTEMS  Review of Systems   Unable to perform ROS: Medical condition   Neurological: Positive for focal weakness.       PAST MEDICAL HISTORY   has a past medical history of Brain bleed (HCC) (2008), Dementia (HCC), Seizure (HCC), and Urinary tract infection.    SURGICAL HISTORY  patient denies any surgical history    SOCIAL HISTORY  Social History     Tobacco Use   • Smoking status: Never Smoker   • Smokeless tobacco: Never Used   Vaping Use   • Vaping Use: Never used   Substance Use Topics   • Alcohol use: No   • Drug use: No      Social History     Substance and Sexual Activity   Drug Use No       FAMILY HISTORY  Family History   Problem Relation Age of Onset   • Heart  "Failure Mother    • Stroke Father    • Heart Failure Father    • No Known Problems Maternal Grandmother    • No Known Problems Maternal Grandfather    • No Known Problems Paternal Grandmother    • No Known Problems Paternal Grandfather    • Cancer Sister        CURRENT MEDICATIONS  Home Medications    **Home medications have not yet been reviewed for this encounter**         ALLERGIES  No Known Allergies    PHYSICAL EXAM  VITAL SIGNS: BP (!) 86/53   Pulse (!) 131   Temp 36.3 °C (97.4 °F) (Temporal)   Resp 14   Ht 1.549 m (5' 1\")   Wt 54.4 kg (120 lb)   LMP  (LMP Unknown)   SpO2 94%   BMI 22.67 kg/m²   Vitals reviewed.  Constitutional: Patient does not answer verbally to questions or follow commands.  Patient has a leftward gaze.  There is resolving bruise to her mid forehead.  No palpable abnormalities.  Head: Normocephalic and atraumatic.    Ears: Normal external ears bilaterally.   Mouth/Throat: Oropharynx is clear with dry MM  Eyes: Conjunctivae are normal. Pupils are equal, round, and reactive to light. leftward gaze.  Neck: Neck supple.  Cardiovascular: tachycardia, regular rhythm and normal heart sounds. Normal peripheral pulses.  Pulmonary/Chest: Effort normal and breath sounds normal. No respiratory distress, no wheezes, rhonchi, or rales.   Abdominal: Soft. Bowel sounds are normal. There is no grimace with palpation  Musculoskeletal: No edema   Lymphadenopathy: No cervical adenopathy.   Neurological: Paralysis, right upper and lower extremity.  No obvious cranial nerve deficits.  Patient has a leftward gaze.  She has right-sided neglect.  She does not follow commands but when attempting to examine strength, patient swats me away. No verbal response to questioning.  Skin: Skin is warm and dry. No erythema. No pallor.   Psychiatric: Unable to assess.    LABS  Results for orders placed or performed during the hospital encounter of 11/14/21   CBC WITH DIFFERENTIAL   Result Value Ref Range    WBC 7.6 4.8 " - 10.8 K/uL    RBC 4.37 4.20 - 5.40 M/uL    Hemoglobin 13.3 12.0 - 16.0 g/dL    Hematocrit 40.9 37.0 - 47.0 %    MCV 93.6 81.4 - 97.8 fL    MCH 30.4 27.0 - 33.0 pg    MCHC 32.5 (L) 33.6 - 35.0 g/dL    RDW 46.1 35.9 - 50.0 fL    Platelet Count 184 164 - 446 K/uL    MPV 10.0 9.0 - 12.9 fL    Neutrophils-Polys 55.30 44.00 - 72.00 %    Lymphocytes 31.50 22.00 - 41.00 %    Monocytes 10.60 0.00 - 13.40 %    Eosinophils 1.50 0.00 - 6.90 %    Basophils 0.80 0.00 - 1.80 %    Immature Granulocytes 0.30 0.00 - 0.90 %    Nucleated RBC 0.00 /100 WBC    Neutrophils (Absolute) 4.20 2.00 - 7.15 K/uL    Lymphs (Absolute) 2.39 1.00 - 4.80 K/uL    Monos (Absolute) 0.80 0.00 - 0.85 K/uL    Eos (Absolute) 0.11 0.00 - 0.51 K/uL    Baso (Absolute) 0.06 0.00 - 0.12 K/uL    Immature Granulocytes (abs) 0.02 0.00 - 0.11 K/uL    NRBC (Absolute) 0.00 K/uL   COMP METABOLIC PANEL   Result Value Ref Range    Sodium 141 135 - 145 mmol/L    Potassium 4.0 3.6 - 5.5 mmol/L    Chloride 108 96 - 112 mmol/L    Co2 23 20 - 33 mmol/L    Anion Gap 10.0 7.0 - 16.0    Glucose 121 (H) 65 - 99 mg/dL    Bun 24 (H) 8 - 22 mg/dL    Creatinine 0.56 0.50 - 1.40 mg/dL    Calcium 9.4 8.5 - 10.5 mg/dL    AST(SGOT) 10 (L) 12 - 45 U/L    ALT(SGPT) 6 2 - 50 U/L    Alkaline Phosphatase 62 30 - 99 U/L    Total Bilirubin 0.4 0.1 - 1.5 mg/dL    Albumin 4.1 3.2 - 4.9 g/dL    Total Protein 7.0 6.0 - 8.2 g/dL    Globulin 2.9 1.9 - 3.5 g/dL    A-G Ratio 1.4 g/dL   PROTHROMBIN TIME   Result Value Ref Range    PT 13.6 12.0 - 14.6 sec    INR 1.07 0.87 - 1.13   APTT   Result Value Ref Range    APTT 34.0 24.7 - 36.0 sec   COD (ADULT)   Result Value Ref Range    ABO Grouping Only O     Rh Grouping Only POS     Antibody Screen-Cod NEG    TROPONIN   Result Value Ref Range    Troponin T 11 6 - 19 ng/L   ESTIMATED GFR   Result Value Ref Range    GFR If African American >60 >60 mL/min/1.73 m 2    GFR If Non African American >60 >60 mL/min/1.73 m 2   EKG (NOW)   Result Value Ref Range     Report       [facility] Emergency Dept.    Test Date:  2021  Pt Name:    RODRIGUEZ MARKS  Department: ER  MRN:        [id number]                      Room:       RD 12  Gender:     Female                       Technician:  :        [id number]                   Requested By:MONY RNAGEL  Order #:    [id number]                    Reading MD:    Measurements  Intervals                                Axis  Rate:       118                          P:  CT:                                      QRS:        -8  QRSD:       78                           T:          -90  QT:         349  QTc:        489    Interpretive Statements  Atrial fibrillation  Low voltage, extremity leads  Repol abnrm suggests ischemia, diffuse leads  No previous ECG available for comparison         All labs reviewed by me.    EKG Interpretation  Interpreted by me    RADIOLOGY  DX-CHEST-PORTABLE (1 VIEW)   Final Result         1. No acute cardiopulmonary abnormalities are identified.      CT-CTA NECK WITH & W/O-POST PROCESSING   Final Result      1. No evidence of flow-limiting stenosis in the cervical carotid or cervical vertebral arteries.      CT-CTA HEAD WITH & W/O-POST PROCESS   Final Result         1. No hemodynamically significant narrowing of the major intracranial vessels.      2. Redemonstration of large parenchymal hemorrhage in the left posterior frontal lobe. There is a focus of active bleeding in the posterior aspect of the hemorrhage.         CT-CEREBRAL PERFUSION ANALYSIS   Final Result      1.  Cerebral blood flow less than 30% likely representing completed infarct = 0 mL.      2.  T Max more than 6 seconds likely artifact infarct and ischemia = 65 mL.      3.  Mismatched volume = 65      4.  Please note that the cerebral perfusion was performed on the limited brain tissue around the basal ganglia region. Infarct/ischemia outside the CT perfusion sections can be missed in this study.      CT-HEAD  W/O   Final Result         Limited exam due to motion artifact.      There is 3.8 x 6.8 cm parenchymal hemorrhage in the left posterior frontal lobe with significant mass effect.      There is a 7 mm left-to-right midline shift.      Preliminary findings discussed with Dr. MONY RANGEL via Voalte on 11/14/2021 5:37 AM                 The radiologist's interpretation of all radiological studies have been reviewed by me.    COURSE & MEDICAL DECISION MAKING  Pertinent Labs & Imaging studies reviewed. (See chart for details)    Obtained and reviewed past medical records.  Patient's last ED encounter was in April of last year she was seen after a fall, diagnosed with a closed head injury.  Neurology is most recent outpatient note indicates patient has a history of left temporal hemorrhage with subdural hematoma symptomatic vascular dementia with a history of seizures.  It is noted, that as always, the history is obtained from the patient's .  Patient was incapable of giving any meaningful responses.    5:20 AM - Patient seen and examined at charge desk.  Patient presents via EMS as a code stroke.  This is an 87-year-old female.  There is no history available from the patient.  She is not anticoagulated per EMS report and per review of a medication list that accompanies the patient.  She was last seen at 7 PM and was normal.  At 415 this morning,  found her on the floor though it did not appear that she had fallen.  She would not respond verbally.  She has a leftward gaze, right-sided neglect and right-sided weakness.  Patient will not answer any of my questions.  When attempting to do neurologic exam, patient swats me away.  She appears to have good strength in her left upper and lower extremity and I do not appreciate any facial asymmetry.  She is taken emergently for imaging.  Shortly after arrival, Dr. Figueredo, neurology called for report on the patient.  He was able to inform me, that the patient was last  seen in their clinic fairly recently.  She has a history of a left subdural hematoma, history of seizure disorder and vascular dementia.  She recently had a de-escalation in her care secondary to her dementia and they discontinued her Vimpat.      0535AM Via Voalte, notified by Dr. Monk, radiologist regarding patient's  CT imaging consistent with an acute, parenchymal hemorrhage with 7 mm of shift.  No evidence of subdural hematoma.    5:39 AM discussed with neurology, Dr. Figueredo, regarding CT findings.  This is a large new hemorrhagic stroke with mass-effect.  He recommends no intervention other than BP control, pain management and notifying .  Palliative consult would also be appropriate.    0605AM N/S paged    0610AM Daughter at bedside,  and daughter, made aware of the grave prognosis.  We reviewed CT scan together.  Reviewed Dr. Figueredo's note.    0639AM D/W Dr. Garcia, N/S, who will see the patient in consultation.  Given the patient's poor neurologic status, involvement of the dominant hemisphere, he does not recommend any surgical intervention and agrees with palliative care. Hospitalist paged.     0707Am D/W Dr. Guerra hospitalist, Dr. Garcia at bedside.  Per discussion with family,  They are quite certain, patient would choose comfort measures for herself and this plan of care will be undertaken.    Patient is admitted in grave condition.    The total critical care time on this patient is 70 minutes, resuscitating patient, speaking with admitting physician, and deciphering test results. This 70 minutes is exclusive of separately billable procedures.      FINAL IMPRESSION  1. Intraparenchymal hemorrhage  2.  Right-sided paralysis, right-sided neglect.  3.  History of vascular dementia  4.  Comfort care measures only    Critical Care time: 70 minutes

## 2021-11-15 NOTE — PROGRESS NOTES
Received in bed, unresponsive, comfort care.Assessment as per CDU. Family at bedside. Plan of care discussed and understood by family. Call light within reach. To continue to monitor.

## 2021-11-15 NOTE — PROGRESS NOTES
Vomited 2x, bile looking about 200 cc, changed linens 2x, zofran iv given. Oral care done, perineal care rendered.

## 2021-11-15 NOTE — PROGRESS NOTES
[facility] Medicine Daily Progress Note    Date of Service  11/15/2021    Chief Complaint  Danielle Camacho is a 87 y.o. female admitted 11/14/2021 with Intracranial bleed.    Hospital Course   Ms Camacho has past medical history that includes dementia, prior intraparenchymal hemorrhage, seizure disorder,advanced global aphasia and difficulties with paranoia.  The patient is nonverbal during original  ER exam and thus unable to provide any history of present illness.  Her  was at the bedside.  He describes relatively normal day yesterday although he is questioning now whether she had some intermittent difficulties with her right side.  This morning patient was found on her floor by her  unable to use her right side.  She is brought to emergency room for evaluation where a CT scan of the head showed a large left-sided intraparenchymal hemorrhage with probable ongoing extravasation and midline shift. NRSY was consulted and do not recommend any surgical interventions at this time based on patients age and functionality.  Admitting physician had an extensive discussion with the patient's , her daughter, and grandson.  Patient's  is quite sure that patient would choose comfort measures for herself at this time and discussed admission to the hospital for comfort care.      Interval Problem Update  11/15 afebrile, SBP one teens, heart rate 60s-80s, sating 70s to 80s on room air.  Patient having a lot of noisy breathing with rales.  On comfort care/DNR. Family at bedside, asking to make her more comfortable, RN notified.     I have personally seen and examined the patient at bedside. I discussed the plan of care with patient, family, bedside RN, charge RN and .    Consultants/Specialty  neurosurgery    Code Status  Comfort Care/DNR    Disposition  Patient is not medically cleared.   Anticipate discharge to comfort care and expected imminent death.  I have placed the appropriate  orders for post-discharge needs.    Review of Systems  Review of Systems   Unable to perform ROS: Medical condition        Physical Exam  Temp:  [36.1 °C (96.9 °F)] 36.1 °C (96.9 °F)  Pulse:  [87] 87  Resp:  [18] 18  BP: (112)/(52) 112/52  SpO2:  [74 %-92 %] 74 %    Physical Exam  Vitals and nursing note reviewed.   Constitutional:       General: She is sleeping.      Appearance: She is ill-appearing.   HENT:      Head: Normocephalic and atraumatic.      Right Ear: Hearing normal.      Left Ear: Hearing normal.      Nose: Nose normal.   Neck:      Vascular: No JVD.   Cardiovascular:      Rate and Rhythm: Normal rate and regular rhythm.      Heart sounds: Normal heart sounds, S1 normal and S2 normal.   Pulmonary:      Effort: Pulmonary effort is normal.      Breath sounds: Stridor, decreased air movement and transmitted upper airway sounds present. Examination of the right-upper field reveals rales. Examination of the left-upper field reveals rales. Examination of the right-middle field reveals rales. Examination of the left-middle field reveals rales. Examination of the right-lower field reveals rales. Examination of the left-lower field reveals rales. Rales present.   Musculoskeletal:      Cervical back: Neck supple.   Skin:     General: Skin is warm and dry.      Capillary Refill: Capillary refill takes less than 2 seconds.   Neurological:      Mental Status: She is lethargic.      Cranial Nerves: No facial asymmetry.         Fluids    Intake/Output Summary (Last 24 hours) at 11/15/2021 1029  Last data filed at 11/14/2021 1908  Gross per 24 hour   Intake --   Output 1000 ml   Net -1000 ml       Laboratory  Recent Labs     11/14/21  0514   WBC 7.6   RBC 4.37   HEMOGLOBIN 13.3   HEMATOCRIT 40.9   MCV 93.6   MCH 30.4   MCHC 32.5*   RDW 46.1   PLATELETCT 184   MPV 10.0     Recent Labs     11/14/21  0514   SODIUM 141   POTASSIUM 4.0   CHLORIDE 108   CO2 23   GLUCOSE 121*   BUN 24*   CREATININE 0.56   CALCIUM 9.4      Recent Labs     11/14/21  0514   APTT 34.0   INR 1.07               Imaging  DX-CHEST-PORTABLE (1 VIEW)   Final Result         1. No acute cardiopulmonary abnormalities are identified.      CT-CTA NECK WITH & W/O-POST PROCESSING   Final Result      1. No evidence of flow-limiting stenosis in the cervical carotid or cervical vertebral arteries.      CT-CTA HEAD WITH & W/O-POST PROCESS   Final Result   Addendum 1 of 1   Preliminary findings discussed with Dr. MONY RANGEL in  via Voatle on    11/14/2021 6:25 a AM      Final         1. No hemodynamically significant narrowing of the major intracranial vessels.      2. Redemonstration of large parenchymal hemorrhage in the left posterior frontal lobe. There is a focus of active bleeding in the posterior aspect of the hemorrhage.         CT-CEREBRAL PERFUSION ANALYSIS   Final Result      1.  Cerebral blood flow less than 30% likely representing completed infarct = 0 mL.      2.  T Max more than 6 seconds likely artifact infarct and ischemia = 65 mL.      3.  Mismatched volume = 65      4.  Please note that the cerebral perfusion was performed on the limited brain tissue around the basal ganglia region. Infarct/ischemia outside the CT perfusion sections can be missed in this study.      CT-HEAD W/O   Final Result         Limited exam due to motion artifact.      There is 3.8 x 6.8 cm parenchymal hemorrhage in the left posterior frontal lobe with significant mass effect.      There is a 7 mm left-to-right midline shift.      Preliminary findings discussed with Dr. MONY RANGEL via Voalte on 11/14/2021 5:37 AM                    Assessment/Plan  Intraparenchymal hematoma of brain (HCC)- (present on admission)  Assessment & Plan  There is a large left-sided intraparenchymal hemorrhage   Nontraumatic, suspect this is related to amyloid angiopathy as patient has history of multiple bleeds  Patient is very developed midline shift, there is active extravasation, this is likely  not a survivable event, if she did survive this would likely lose all of her remaining independence  Family in agreement that the patient's goals of care would be comfort knowing the expected prognosis  Expect the patient to  within 24 hours  IV morphine, IV Ativan as needed for any indication of pain or dypsnea    Vascular dementia without behavioral disturbance (HCC)- (present on admission)  Assessment & Plan  Patient has dementia with loss of independence at baseline    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 In review of yesterday's note (2021), there are no changes except as documented above.

## 2021-11-16 NOTE — PROGRESS NOTES
Report received from DEZ Santana.  Patient is comfort care.  She is responsive to pain and verbal speech.  Pt has noisy and gurgled breathing.  Pt needing to be suctioned frequently.  Family at bedside.  No apparent signs of distress.  Pt will be medicated per MAR for pain per family's request.  Safety precautions in place.  Pt's family asked to call is pt appears to be in pain or restless.  Hourly rounding in place. COVID 19 Surge in effect.

## 2021-11-16 NOTE — PROGRESS NOTES
Assumed care of patient, patient is comfort care. Medication frequency increased. Patient suctioned with lots of secretions. Comfort maintained

## 2021-11-16 NOTE — DISCHARGE SUMMARY
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 *      History of Presenting Illness and Hospital Course  Danielle Camacho is a 87 y.o. female who presented 2021 with right sided weakness.     : Ms Camacho has past medical history that includes dementia, prior intraparenchymal hemorrhage, and seizure disorder.  A note from outpatient neurology on 2021 details the patient's advanced global aphasia .    The patient is nonverbal.   The  morning of hospital admission, patient was found on her floor by her  unable to use her right side.  She is brought to emergency room for evaluation where a CT scan of the head showed a large left-sided intraparenchymal hemorrhage with probable ongoing extravasation and midline shift.    This patient was made comfort care and comfort care protocol was initiated with pain management with as needed IV morphine.  The patient  on 2021 and was pronounced by 2 RNs.    Death Date: 21   Death Time: 835         Pronounced By (RN1): Geoff Martinez  Pronounced By (RN2): Day Phan

## 2021-11-16 NOTE — PROGRESS NOTES
Family has come to terms with patients condition and want her to transition as soon as possible. No pupil, gag or cough reflex present. Medication given as frequently as possible per MAR to maintain patients comfort per family's request.

## 2021-11-16 NOTE — PROGRESS NOTES
Family requests to increase frequency of comfort med. MD notified. Dose increased. Family at bedside
